# Patient Record
Sex: MALE | Race: WHITE | NOT HISPANIC OR LATINO | Employment: FULL TIME | URBAN - NONMETROPOLITAN AREA
[De-identification: names, ages, dates, MRNs, and addresses within clinical notes are randomized per-mention and may not be internally consistent; named-entity substitution may affect disease eponyms.]

---

## 2018-04-07 ENCOUNTER — APPOINTMENT (OUTPATIENT)
Dept: LAB | Facility: HOSPITAL | Age: 38
End: 2018-04-07
Attending: INTERNAL MEDICINE

## 2018-04-07 ENCOUNTER — TRANSCRIBE ORDERS (OUTPATIENT)
Dept: ADMINISTRATIVE | Facility: HOSPITAL | Age: 38
End: 2018-04-07

## 2018-04-07 DIAGNOSIS — Z13.6 SCREENING FOR CARDIOVASCULAR CONDITION: Primary | ICD-10-CM

## 2018-04-07 DIAGNOSIS — Z77.21: ICD-10-CM

## 2018-04-07 DIAGNOSIS — N52.9 ERECTILE DYSFUNCTION, UNSPECIFIED ERECTILE DYSFUNCTION TYPE: ICD-10-CM

## 2018-04-07 LAB
ALBUMIN SERPL-MCNC: 4.2 G/DL (ref 3.5–5)
ALBUMIN/GLOB SERPL: 1.6 G/DL (ref 1.5–2.5)
ALP SERPL-CCNC: 81 U/L (ref 40–129)
ALT SERPL W P-5'-P-CCNC: 44 U/L (ref 10–44)
ANION GAP SERPL CALCULATED.3IONS-SCNC: 6.5 MMOL/L (ref 3.6–11.2)
AST SERPL-CCNC: 30 U/L (ref 10–34)
BASOPHILS # BLD AUTO: 0.04 10*3/MM3 (ref 0–0.3)
BASOPHILS NFR BLD AUTO: 0.6 % (ref 0–2)
BILIRUB SERPL-MCNC: 0.7 MG/DL (ref 0.2–1.8)
BUN BLD-MCNC: 14 MG/DL (ref 7–21)
BUN/CREAT SERPL: 13.2 (ref 7–25)
CALCIUM SPEC-SCNC: 9.6 MG/DL (ref 7.7–10)
CHLORIDE SERPL-SCNC: 107 MMOL/L (ref 99–112)
CHOLEST SERPL-MCNC: 242 MG/DL (ref 0–200)
CO2 SERPL-SCNC: 27.5 MMOL/L (ref 24.3–31.9)
CREAT BLD-MCNC: 1.06 MG/DL (ref 0.43–1.29)
DEPRECATED RDW RBC AUTO: 39.8 FL (ref 37–54)
EOSINOPHIL # BLD AUTO: 0.08 10*3/MM3 (ref 0–0.7)
EOSINOPHIL NFR BLD AUTO: 1.2 % (ref 0–5)
ERYTHROCYTE [DISTWIDTH] IN BLOOD BY AUTOMATED COUNT: 12.5 % (ref 11.5–14.5)
FOLATE SERPL-MCNC: 14.6 NG/ML (ref 5.4–20)
GFR SERPL CREATININE-BSD FRML MDRD: 79 ML/MIN/1.73
GLOBULIN UR ELPH-MCNC: 2.7 GM/DL
GLUCOSE BLD-MCNC: 100 MG/DL (ref 70–110)
HBV SURFACE AB SER RIA-ACNC: NORMAL
HCT VFR BLD AUTO: 47.1 % (ref 42–52)
HCV AB SER DONR QL: NORMAL
HDLC SERPL-MCNC: 56 MG/DL (ref 60–100)
HGB BLD-MCNC: 16.3 G/DL (ref 14–18)
IMM GRANULOCYTES # BLD: 0.03 10*3/MM3 (ref 0–0.03)
IMM GRANULOCYTES NFR BLD: 0.4 % (ref 0–0.5)
LDLC SERPL CALC-MCNC: 145 MG/DL (ref 0–100)
LDLC/HDLC SERPL: 2.59 {RATIO}
LYMPHOCYTES # BLD AUTO: 2.61 10*3/MM3 (ref 1–3)
LYMPHOCYTES NFR BLD AUTO: 37.9 % (ref 21–51)
MCH RBC QN AUTO: 30.2 PG (ref 27–33)
MCHC RBC AUTO-ENTMCNC: 34.6 G/DL (ref 33–37)
MCV RBC AUTO: 87.4 FL (ref 80–94)
MONOCYTES # BLD AUTO: 0.57 10*3/MM3 (ref 0.1–0.9)
MONOCYTES NFR BLD AUTO: 8.3 % (ref 0–10)
NEUTROPHILS # BLD AUTO: 3.56 10*3/MM3 (ref 1.4–6.5)
NEUTROPHILS NFR BLD AUTO: 51.6 % (ref 30–70)
OSMOLALITY SERPL CALC.SUM OF ELEC: 281.8 MOSM/KG (ref 273–305)
PLATELET # BLD AUTO: 232 10*3/MM3 (ref 130–400)
PMV BLD AUTO: 9.6 FL (ref 6–10)
POTASSIUM BLD-SCNC: 3.9 MMOL/L (ref 3.5–5.3)
PROT SERPL-MCNC: 6.9 G/DL (ref 6–8)
RBC # BLD AUTO: 5.39 10*6/MM3 (ref 4.7–6.1)
SODIUM BLD-SCNC: 141 MMOL/L (ref 135–153)
TESTOST SERPL-MCNC: 468.14 NG/DL (ref 123.06–813.86)
TRIGL SERPL-MCNC: 206 MG/DL (ref 0–150)
TSH SERPL DL<=0.05 MIU/L-ACNC: 1.4 MIU/ML (ref 0.55–4.78)
VIT B12 BLD-MCNC: 613 PG/ML (ref 211–911)
VLDLC SERPL-MCNC: 41.2 MG/DL
WBC NRBC COR # BLD: 6.89 10*3/MM3 (ref 4.5–12.5)

## 2018-04-07 PROCEDURE — 84443 ASSAY THYROID STIM HORMONE: CPT | Performed by: INTERNAL MEDICINE

## 2018-04-07 PROCEDURE — 86803 HEPATITIS C AB TEST: CPT | Performed by: INTERNAL MEDICINE

## 2018-04-07 PROCEDURE — 86706 HEP B SURFACE ANTIBODY: CPT | Performed by: INTERNAL MEDICINE

## 2018-04-07 PROCEDURE — 82607 VITAMIN B-12: CPT | Performed by: INTERNAL MEDICINE

## 2018-04-07 PROCEDURE — 85025 COMPLETE CBC W/AUTO DIFF WBC: CPT | Performed by: INTERNAL MEDICINE

## 2018-04-07 PROCEDURE — 80061 LIPID PANEL: CPT | Performed by: INTERNAL MEDICINE

## 2018-04-07 PROCEDURE — 84403 ASSAY OF TOTAL TESTOSTERONE: CPT | Performed by: INTERNAL MEDICINE

## 2018-04-07 PROCEDURE — 82746 ASSAY OF FOLIC ACID SERUM: CPT | Performed by: INTERNAL MEDICINE

## 2018-04-07 PROCEDURE — 80053 COMPREHEN METABOLIC PANEL: CPT | Performed by: INTERNAL MEDICINE

## 2018-04-07 PROCEDURE — 36415 COLL VENOUS BLD VENIPUNCTURE: CPT | Performed by: INTERNAL MEDICINE

## 2020-12-01 ENCOUNTER — TRANSCRIBE ORDERS (OUTPATIENT)
Dept: ADMINISTRATIVE | Facility: HOSPITAL | Age: 40
End: 2020-12-01

## 2020-12-01 DIAGNOSIS — M54.50 LOW BACK PAIN, UNSPECIFIED BACK PAIN LATERALITY, UNSPECIFIED CHRONICITY, UNSPECIFIED WHETHER SCIATICA PRESENT: Primary | ICD-10-CM

## 2020-12-01 DIAGNOSIS — R10.31 ABDOMINAL PAIN, RIGHT LOWER QUADRANT: ICD-10-CM

## 2020-12-09 ENCOUNTER — HOSPITAL ENCOUNTER (OUTPATIENT)
Dept: MRI IMAGING | Facility: HOSPITAL | Age: 40
Discharge: HOME | End: 2020-12-09

## 2020-12-09 ENCOUNTER — HOSPITAL ENCOUNTER (OUTPATIENT)
Dept: ULTRASOUND IMAGING | Facility: HOSPITAL | Age: 40
Discharge: HOME | End: 2020-12-09

## 2020-12-09 DIAGNOSIS — M54.50 LOW BACK PAIN, UNSPECIFIED BACK PAIN LATERALITY, UNSPECIFIED CHRONICITY, UNSPECIFIED WHETHER SCIATICA PRESENT: ICD-10-CM

## 2020-12-09 DIAGNOSIS — R10.31 ABDOMINAL PAIN, RIGHT LOWER QUADRANT: ICD-10-CM

## 2020-12-09 PROCEDURE — 76700 US EXAM ABDOM COMPLETE: CPT | Performed by: RADIOLOGY

## 2020-12-09 PROCEDURE — 76700 US EXAM ABDOM COMPLETE: CPT

## 2020-12-09 PROCEDURE — 72148 MRI LUMBAR SPINE W/O DYE: CPT | Performed by: RADIOLOGY

## 2020-12-09 PROCEDURE — 72148 MRI LUMBAR SPINE W/O DYE: CPT

## 2021-01-07 ENCOUNTER — TRANSCRIBE ORDERS (OUTPATIENT)
Dept: ADMINISTRATIVE | Facility: HOSPITAL | Age: 41
End: 2021-01-07

## 2021-01-07 DIAGNOSIS — Z01.818 PRE-OPERATIVE CLEARANCE: Primary | ICD-10-CM

## 2021-01-10 ENCOUNTER — APPOINTMENT (OUTPATIENT)
Dept: MRI IMAGING | Facility: HOSPITAL | Age: 41
End: 2021-01-10

## 2021-01-10 ENCOUNTER — HOSPITAL ENCOUNTER (EMERGENCY)
Facility: HOSPITAL | Age: 41
Discharge: HOME | End: 2021-01-10
Attending: FAMILY MEDICINE | Admitting: FAMILY MEDICINE

## 2021-01-10 VITALS
WEIGHT: 230 LBS | BODY MASS INDEX: 34.07 KG/M2 | OXYGEN SATURATION: 98 % | HEART RATE: 76 BPM | HEIGHT: 69 IN | TEMPERATURE: 98 F | DIASTOLIC BLOOD PRESSURE: 76 MMHG | SYSTOLIC BLOOD PRESSURE: 142 MMHG | RESPIRATION RATE: 16 BRPM

## 2021-01-10 DIAGNOSIS — M51.26 LUMBAR DISC HERNIATION: Primary | ICD-10-CM

## 2021-01-10 LAB
ALBUMIN SERPL-MCNC: 4.25 G/DL (ref 3.5–5.2)
ALBUMIN/GLOB SERPL: 1.6 G/DL
ALP SERPL-CCNC: 84 U/L (ref 39–117)
ALT SERPL W P-5'-P-CCNC: 15 U/L (ref 1–41)
ANION GAP SERPL CALCULATED.3IONS-SCNC: 12.5 MMOL/L (ref 5–15)
AST SERPL-CCNC: 14 U/L (ref 1–40)
BASOPHILS # BLD AUTO: 0.06 10*3/MM3 (ref 0–0.2)
BASOPHILS NFR BLD AUTO: 0.7 % (ref 0–1.5)
BILIRUB SERPL-MCNC: 0.5 MG/DL (ref 0–1.2)
BUN SERPL-MCNC: 16 MG/DL (ref 6–20)
BUN/CREAT SERPL: 16.3 (ref 7–25)
CALCIUM SPEC-SCNC: 9.2 MG/DL (ref 8.6–10.5)
CHLORIDE SERPL-SCNC: 107 MMOL/L (ref 98–107)
CO2 SERPL-SCNC: 20.5 MMOL/L (ref 22–29)
CREAT SERPL-MCNC: 0.98 MG/DL (ref 0.76–1.27)
CRP SERPL-MCNC: <0.3 MG/DL (ref 0–0.5)
DEPRECATED RDW RBC AUTO: 41.3 FL (ref 37–54)
EOSINOPHIL # BLD AUTO: 0.15 10*3/MM3 (ref 0–0.4)
EOSINOPHIL NFR BLD AUTO: 1.8 % (ref 0.3–6.2)
ERYTHROCYTE [DISTWIDTH] IN BLOOD BY AUTOMATED COUNT: 12.6 % (ref 12.3–15.4)
ERYTHROCYTE [SEDIMENTATION RATE] IN BLOOD: 4 MM/HR (ref 0–15)
GFR SERPL CREATININE-BSD FRML MDRD: 85 ML/MIN/1.73
GLOBULIN UR ELPH-MCNC: 2.7 GM/DL
GLUCOSE SERPL-MCNC: 117 MG/DL (ref 65–99)
HCT VFR BLD AUTO: 48.1 % (ref 37.5–51)
HGB BLD-MCNC: 16.4 G/DL (ref 13–17.7)
IMM GRANULOCYTES # BLD AUTO: 0.03 10*3/MM3 (ref 0–0.05)
IMM GRANULOCYTES NFR BLD AUTO: 0.4 % (ref 0–0.5)
LYMPHOCYTES # BLD AUTO: 2.23 10*3/MM3 (ref 0.7–3.1)
LYMPHOCYTES NFR BLD AUTO: 26.9 % (ref 19.6–45.3)
MCH RBC QN AUTO: 30.7 PG (ref 26.6–33)
MCHC RBC AUTO-ENTMCNC: 34.1 G/DL (ref 31.5–35.7)
MCV RBC AUTO: 90.1 FL (ref 79–97)
MONOCYTES # BLD AUTO: 0.47 10*3/MM3 (ref 0.1–0.9)
MONOCYTES NFR BLD AUTO: 5.7 % (ref 5–12)
NEUTROPHILS NFR BLD AUTO: 5.36 10*3/MM3 (ref 1.7–7)
NEUTROPHILS NFR BLD AUTO: 64.5 % (ref 42.7–76)
NRBC BLD AUTO-RTO: 0 /100 WBC (ref 0–0.2)
PLATELET # BLD AUTO: 252 10*3/MM3 (ref 140–450)
PMV BLD AUTO: 8.7 FL (ref 6–12)
POTASSIUM SERPL-SCNC: 3.9 MMOL/L (ref 3.5–5.2)
PROT SERPL-MCNC: 6.9 G/DL (ref 6–8.5)
RBC # BLD AUTO: 5.34 10*6/MM3 (ref 4.14–5.8)
SODIUM SERPL-SCNC: 140 MMOL/L (ref 136–145)
WBC # BLD AUTO: 8.3 10*3/MM3 (ref 3.4–10.8)

## 2021-01-10 PROCEDURE — 72148 MRI LUMBAR SPINE W/O DYE: CPT | Performed by: RADIOLOGY

## 2021-01-10 PROCEDURE — 25010000002 MORPHINE PER 10 MG: Performed by: FAMILY MEDICINE

## 2021-01-10 PROCEDURE — 86140 C-REACTIVE PROTEIN: CPT | Performed by: FAMILY MEDICINE

## 2021-01-10 PROCEDURE — 80053 COMPREHEN METABOLIC PANEL: CPT | Performed by: FAMILY MEDICINE

## 2021-01-10 PROCEDURE — 25010000002 ONDANSETRON PER 1 MG

## 2021-01-10 PROCEDURE — 96375 TX/PRO/DX INJ NEW DRUG ADDON: CPT

## 2021-01-10 PROCEDURE — 99283 EMERGENCY DEPT VISIT LOW MDM: CPT

## 2021-01-10 PROCEDURE — 85025 COMPLETE CBC W/AUTO DIFF WBC: CPT | Performed by: FAMILY MEDICINE

## 2021-01-10 PROCEDURE — 85652 RBC SED RATE AUTOMATED: CPT | Performed by: FAMILY MEDICINE

## 2021-01-10 PROCEDURE — 25010000002 METHYLPREDNISOLONE PER 40 MG: Performed by: FAMILY MEDICINE

## 2021-01-10 PROCEDURE — 72148 MRI LUMBAR SPINE W/O DYE: CPT

## 2021-01-10 PROCEDURE — 96374 THER/PROPH/DIAG INJ IV PUSH: CPT

## 2021-01-10 RX ORDER — METHYLPREDNISOLONE 4 MG/1
TABLET ORAL
Qty: 21 EACH | Refills: 0 | Status: SHIPPED | OUTPATIENT
Start: 2021-01-10 | End: 2021-11-03

## 2021-01-10 RX ORDER — HYDROCODONE BITARTRATE AND ACETAMINOPHEN 7.5; 325 MG/1; MG/1
1 TABLET ORAL EVERY 6 HOURS PRN
Qty: 10 TABLET | Refills: 0 | Status: SHIPPED | OUTPATIENT
Start: 2021-01-10 | End: 2021-01-10 | Stop reason: SDUPTHER

## 2021-01-10 RX ORDER — ONDANSETRON 2 MG/ML
INJECTION INTRAMUSCULAR; INTRAVENOUS
Status: COMPLETED
Start: 2021-01-10 | End: 2021-01-10

## 2021-01-10 RX ORDER — HYDROCODONE BITARTRATE AND ACETAMINOPHEN 7.5; 325 MG/1; MG/1
1 TABLET ORAL EVERY 6 HOURS PRN
Qty: 10 TABLET | Refills: 0 | Status: SHIPPED | OUTPATIENT
Start: 2021-01-10 | End: 2021-01-15

## 2021-01-10 RX ORDER — SODIUM CHLORIDE 0.9 % (FLUSH) 0.9 %
10 SYRINGE (ML) INJECTION AS NEEDED
Status: DISCONTINUED | OUTPATIENT
Start: 2021-01-10 | End: 2021-01-10 | Stop reason: HOSPADM

## 2021-01-10 RX ORDER — ONDANSETRON 4 MG/1
4 TABLET, ORALLY DISINTEGRATING ORAL EVERY 6 HOURS PRN
Qty: 10 TABLET | Refills: 0 | Status: SHIPPED | OUTPATIENT
Start: 2021-01-10 | End: 2021-11-03

## 2021-01-10 RX ORDER — METHYLPREDNISOLONE SODIUM SUCCINATE 40 MG/ML
80 INJECTION, POWDER, LYOPHILIZED, FOR SOLUTION INTRAMUSCULAR; INTRAVENOUS ONCE
Status: COMPLETED | OUTPATIENT
Start: 2021-01-10 | End: 2021-01-10

## 2021-01-10 RX ORDER — ONDANSETRON 2 MG/ML
4 INJECTION INTRAMUSCULAR; INTRAVENOUS ONCE
Status: COMPLETED | OUTPATIENT
Start: 2021-01-10 | End: 2021-01-10

## 2021-01-10 RX ADMIN — ONDANSETRON 4 MG: 2 INJECTION INTRAMUSCULAR; INTRAVENOUS at 11:49

## 2021-01-10 RX ADMIN — METHYLPREDNISOLONE SODIUM SUCCINATE 80 MG: 40 INJECTION, POWDER, FOR SOLUTION INTRAMUSCULAR; INTRAVENOUS at 11:48

## 2021-01-10 RX ADMIN — MORPHINE SULFATE 4 MG: 4 INJECTION, SOLUTION INTRAMUSCULAR; INTRAVENOUS at 11:48

## 2021-01-10 NOTE — ED NOTES
Rectal exam performed by Dr. De La O, Pt positioned, privacy provided, chaperoned by myself. Pt tolerated well.      Jaki Watters RN  01/10/21 9769

## 2021-01-10 NOTE — ED PROVIDER NOTES
Subjective   40-year-old male presents the emergency room with complaints of low back pain.  Patient reports that he had an MRI couple weeks ago as well as on December 1 that showed a herniated disc.  He states that he was putting ice on a car seat after discharge and subsequently felt something slip in his back.  He states since that time is been unable to walk.  He states his pain is in his left leg as well.  He denies bowel urinary incontinence.  He states he has pain with any movement.  He reports he has an appointment with Dr. Lane with neurosurgery this week.  Due to symptoms patient came emergency room for evaluation.  Of note patient states he took Robaxin as well as 800 mg ibuprofen today however he states pain persist.      Back Pain  Location:  Lumbar spine  Quality:  Stabbing  Pain severity:  Severe  Timing:  Constant  Chronicity:  New  Associated symptoms: leg pain and weakness    Associated symptoms: no abdominal pain, no abdominal swelling, no bladder incontinence, no bowel incontinence, no chest pain, no fever, no pelvic pain, no perianal numbness and no tingling        Review of Systems   Constitutional: Negative for fatigue and fever.   Cardiovascular: Negative for chest pain.   Gastrointestinal: Negative for abdominal pain and bowel incontinence.   Genitourinary: Negative for bladder incontinence and pelvic pain.   Musculoskeletal: Positive for back pain.   Neurological: Positive for weakness. Negative for tingling.   All other systems reviewed and are negative.      Past Medical History:   Diagnosis Date   • Back pain        No Known Allergies    Past Surgical History:   Procedure Laterality Date   • HERNIA REPAIR     • TONSILLECTOMY     • VASECTOMY         History reviewed. No pertinent family history.    Social History     Socioeconomic History   • Marital status:      Spouse name: Not on file   • Number of children: Not on file   • Years of education: Not on file   • Highest education  level: Not on file   Tobacco Use   • Smoking status: Current Every Day Smoker     Packs/day: 0.50   Substance and Sexual Activity   • Alcohol use: No   • Drug use: No           Objective   Physical Exam  Vitals signs and nursing note reviewed. Exam conducted with a chaperone present.   Constitutional:       Comments: Appears to be in pain.  Nontoxic.   HENT:      Head: Normocephalic and atraumatic.      Nose: Nose normal.      Mouth/Throat:      Mouth: Mucous membranes are moist.   Eyes:      Conjunctiva/sclera: Conjunctivae normal.      Pupils: Pupils are equal, round, and reactive to light.   Neck:      Musculoskeletal: Neck supple.   Cardiovascular:      Rate and Rhythm: Normal rate and regular rhythm.      Comments: 2+ radial pulses 2+ dorsalis pedis 2+ posterior tibialis pulses bilaterally.  No extrasystoles.  Pulmonary:      Comments: Lungs clear to auscultation no rhonchi's rales or wheezes.  Abdominal:      General: Bowel sounds are normal.      Palpations: Abdomen is soft.      Comments: No guarding or rigidity.   Genitourinary:     Rectum: Normal. Normal anal tone.   Musculoskeletal:      Comments: Paraspinal lumbar tenderness.  Pain with movement.  No crepitance.  Pelvis stable.  No clubbing cyanosis or edema.  No calf tenderness.   Skin:     General: Skin is warm.      Capillary Refill: Capillary refill takes less than 2 seconds.   Neurological:      Mental Status: He is alert and oriented to person, place, and time.      Cranial Nerves: No cranial nerve deficit.      Sensory: No sensory deficit.      Comments: 2+ patellar reflexes bilaterally 2+ Achilles reflex bilaterally.  No saddle anesthesia.   Psychiatric:         Mood and Affect: Mood normal.         Procedures           ED Course  ED Course as of Benji 10 1812   Sun Benji 10, 2021   1150 Patient white blood cell count hemoglobin hematocrit unremarkable    [BB]   1200 Patient rectal exam performed with RN chaperone rectal tone intact.    [BB]   1218  Patient sed rate unremarkable    [BB]   1229 CMP slight elevated glucose 117    [BB]   1242 Patient CRP unremarkable    [BB]   1349 Mri Lumbar Spine Without Contrast    Result Date: 1/10/2021   1. No acute fracture or traumatic malalignment. 2. Disc degeneration with superimposed disc protrusions L1/2, L3/4, and L4/5 which contribute to central canal and neural foraminal stenosis as detailed above. 3. There are no levels of severe central canal or neural foraminal stenosis identified or findings to suggest neurogenic claudication. 4. Otherwise unremarkable exam.  This report was finalized on 1/10/2021 12:42 PM by Dr. Kai Beth MD.          [BB]   1349 Viewed MRI.  Patient states his pain has improved some but recurs with any type of movement.  Have contacted neurosurgery at [facility] awaiting callback    [BB]   1351 Have discussed case with Dr. Strickland and discussed patient's symptoms and findings as well as MRI results he states patient be given Medrol Dosepak pain control to follow-up as scheduled.  He states no acute interventions at this time.    [BB]   1356 Discussed findings with patient have discussed recommendations by neurosurgery have discussed warning signs symptoms that warrant return emergency room patient verbalized understanding.    [BB]   1357 [facility] [phone]    [BB]      ED Course User Index  [BB] Laci De La O MD                                           [facility]    Final diagnoses:   Lumbar disc herniation            Laci De La O MD  01/10/21 1812

## 2021-01-15 ENCOUNTER — OFFICE VISIT (OUTPATIENT)
Dept: NEUROSURGERY | Facility: CLINIC | Age: 41
End: 2021-01-15

## 2021-01-15 VITALS — HEIGHT: 69 IN | WEIGHT: 239 LBS | BODY MASS INDEX: 35.4 KG/M2 | TEMPERATURE: 97.3 F

## 2021-01-15 DIAGNOSIS — M47.819 FACET ARTHROPATHY: ICD-10-CM

## 2021-01-15 DIAGNOSIS — M54.9 MECHANICAL BACK PAIN: Primary | ICD-10-CM

## 2021-01-15 DIAGNOSIS — M51.36 DDD (DEGENERATIVE DISC DISEASE), LUMBAR: ICD-10-CM

## 2021-01-15 DIAGNOSIS — M47.816 LUMBAR SPONDYLOSIS: ICD-10-CM

## 2021-01-15 PROBLEM — F41.8 MIXED ANXIETY AND DEPRESSIVE DISORDER: Status: ACTIVE | Noted: 2021-01-15

## 2021-01-15 PROCEDURE — 99204 OFFICE O/P NEW MOD 45 MIN: CPT | Performed by: NEUROLOGICAL SURGERY

## 2021-01-15 RX ORDER — IPRATROPIUM BROMIDE 42 UG/1
SPRAY, METERED NASAL
COMMUNITY
Start: 2020-10-31 | End: 2021-11-03

## 2021-01-15 RX ORDER — LORATADINE, PSEUDOEPHEDRINE SULFATE 5; 120 MG/1; MG/1
TABLET, FILM COATED, EXTENDED RELEASE ORAL
COMMUNITY
Start: 2020-10-31

## 2021-01-15 RX ORDER — GUAIFENESIN 600 MG/1
TABLET, EXTENDED RELEASE ORAL
COMMUNITY
Start: 2020-10-31 | End: 2021-11-03

## 2021-01-15 RX ORDER — IBUPROFEN 800 MG/1
TABLET ORAL
COMMUNITY
Start: 2020-12-03

## 2021-01-15 RX ORDER — METHOCARBAMOL 750 MG/1
TABLET, FILM COATED ORAL
COMMUNITY
Start: 2020-12-03 | End: 2021-11-03

## 2021-01-15 RX ORDER — CITALOPRAM 40 MG/1
TABLET ORAL
COMMUNITY
Start: 2020-11-22

## 2021-01-15 RX ORDER — ALBUTEROL SULFATE 90 UG/1
AEROSOL, METERED RESPIRATORY (INHALATION)
COMMUNITY
Start: 2020-10-31 | End: 2021-11-03

## 2021-01-15 RX ORDER — GABAPENTIN 300 MG/1
CAPSULE ORAL
Qty: 90 CAPSULE | Refills: 0 | Status: SHIPPED | OUTPATIENT
Start: 2021-01-15 | End: 2021-11-03

## 2021-01-15 NOTE — PROGRESS NOTES
Patient: Paras Freed  : 1980    Primary Care Provider: Rony Vicente MD    Requesting Provider: As above        History    Chief Complaint:   1.  Chronic low back pain.  2.  Right thigh numbness and burning.  2.  Numbness in the hands.    History of Present Illness: Mr. Freed is a 40-year-old  who was had a decade long history of low back pain.  He denies any trauma or inciting event.  He has had some numbness rather chronically in the side of the right thigh but more recently has developed some burning in the right thigh.  This does not typically extend below the knee.  He does have some numbness in his hands that is worse at night.  Certain head positions will aggravate this.  He has had a disc protrusion in his neck in the past that caused numbness of multiple digits of the right hand.  His low back symptoms are worse with sitting.  He is better with rest.  In the past he has done physical therapy and chiropractic.  Muscle relaxants have not been particularly helpful.  He does take ibuprofen on a daily basis.  He does not have significant left lower extremity symptoms.  He stopped smoking in .    Review of Systems   Constitutional: Negative for activity change, appetite change, chills, diaphoresis, fatigue, fever and unexpected weight change.   HENT: Positive for ear pain, sinus pressure and sneezing. Negative for congestion, dental problem, drooling, ear discharge, facial swelling, hearing loss, mouth sores, nosebleeds, postnasal drip, rhinorrhea, sinus pain, sore throat, tinnitus, trouble swallowing and voice change.    Eyes: Negative for photophobia, pain, discharge, redness, itching and visual disturbance.   Respiratory: Negative for apnea, cough, choking, chest tightness, shortness of breath, wheezing and stridor.    Cardiovascular: Negative for chest pain, palpitations and leg swelling.   Gastrointestinal: Positive for anal bleeding, blood in stool, constipation and diarrhea.  "Negative for abdominal distention, abdominal pain, nausea, rectal pain and vomiting.   Endocrine: Negative for cold intolerance, heat intolerance, polydipsia, polyphagia and polyuria.   Genitourinary: Negative for decreased urine volume, difficulty urinating, discharge, dysuria, enuresis, flank pain, frequency, genital sores, hematuria, penile pain, penile swelling, scrotal swelling, testicular pain and urgency.   Musculoskeletal: Positive for back pain, neck pain and neck stiffness. Negative for arthralgias, gait problem, joint swelling and myalgias.   Skin: Negative for color change, pallor, rash and wound.   Allergic/Immunologic: Negative for environmental allergies, food allergies and immunocompromised state.   Neurological: Positive for numbness. Negative for dizziness, tremors, seizures, syncope, facial asymmetry, speech difficulty, weakness, light-headedness and headaches.   Hematological: Negative for adenopathy. Does not bruise/bleed easily.   Psychiatric/Behavioral: Negative for agitation, behavioral problems, confusion, decreased concentration, dysphoric mood, hallucinations, self-injury, sleep disturbance and suicidal ideas. The patient is not nervous/anxious and is not hyperactive.        The patient's past medical history, past surgical history, family history, and social history have been reviewed at length in the electronic medical record.    Physical Exam:   Temp 97.3 °F (36.3 °C)   Ht 175.3 cm (69.02\")   Wt 108 kg (239 lb)   BMI 35.28 kg/m²   CONSTITUTIONAL: Patient is well-nourished, pleasant and appears stated age.  CV: Heart regular rate and rhythm without murmur, rub, or gallop.  PULMONARY: Lungs are clear to ascultation.  MUSCULOSKELETAL:  Neck tenderness to palpation is not observed.   ROM in neck is normal.  Extending his low back is quite aggravating.  Right leg raising is negative.  Luke signs are negative.  Tinel's sign is negative at both wrists.  NEUROLOGICAL:  Orientation, " memory, attention span, language function, and cognition have been examined and are intact.  Strength is intact in the upper and lower extremities to direct testing.  Muscle tone is normal throughout.  Station and gait are normal.  Sensation is altered to light touch testing in the anterior and right lateral thigh.  Deep tendon reflexes are 2+ and symmetrical.  Nick's Sign is negative bilaterally. No clonus is elicited.  Coordination is intact.      Medical Decision Making    Data Review:   MRI of the lumbar spine dated 1/10/2021 demonstrates some degenerative disc disease.  This is most pronounced at L1-2 where there is some broad-based disc bulging.  There is a small hemangioma rightward within the vertebral body.  I do not see anything that would clearly impinge on the right L2 or L3 nerve roots.    Diagnosis:   1.  Mechanical low back pain.  2.  Lumbar degenerative disc disease.  3.  Possible meralgia paresthetica.  4.  History of cervical radiculopathy.  5.  Possible peripheral nerve entrapment.    Treatment Options:   I have started the patient on gabapentin.  We will see if this helps with some of his thigh symptoms.  There is no simple solution for his more chronic axial mechanical low back pain.  If his hand symptoms become more problematic then I will check an MRI of the cervical spine as well as electrodiagnostic studies.  He will follow-up in our clinic in several weeks to check on his progress.       Diagnosis Plan   1. Mechanical back pain     2. Lumbar spondylosis  gabapentin (NEURONTIN) 300 MG capsule   3. Facet arthropathy     4. DDD (degenerative disc disease), lumbar         Scribed for Rony Strickland MD by Jaci Anderson, ALICIA 1/15/2021 15:15 EST      I, Dr. Strickland, personally performed the services described in the documentation, as scribed in my presence, and it is both accurate and complete.

## 2021-03-18 ENCOUNTER — BULK ORDERING (OUTPATIENT)
Dept: CASE MANAGEMENT | Facility: OTHER | Age: 41
End: 2021-03-18

## 2021-03-18 DIAGNOSIS — Z23 IMMUNIZATION DUE: ICD-10-CM

## 2021-04-09 ENCOUNTER — HOSPITAL ENCOUNTER (OUTPATIENT)
Dept: GENERAL RADIOLOGY | Facility: HOSPITAL | Age: 41
Discharge: HOME | End: 2021-04-09
Admitting: NURSE PRACTITIONER

## 2021-04-09 ENCOUNTER — TRANSCRIBE ORDERS (OUTPATIENT)
Dept: OTHER | Facility: OTHER | Age: 41
End: 2021-04-09

## 2021-04-09 DIAGNOSIS — M25.562 LEFT KNEE PAIN, UNSPECIFIED CHRONICITY: Primary | ICD-10-CM

## 2021-04-09 DIAGNOSIS — M25.562 LEFT KNEE PAIN, UNSPECIFIED CHRONICITY: ICD-10-CM

## 2021-04-09 PROCEDURE — 73564 X-RAY EXAM KNEE 4 OR MORE: CPT

## 2021-04-09 PROCEDURE — 73564 X-RAY EXAM KNEE 4 OR MORE: CPT | Performed by: RADIOLOGY

## 2021-07-29 ENCOUNTER — HOSPITAL ENCOUNTER (OUTPATIENT)
Dept: HOSPITAL 79 - SLEEP | Age: 41
End: 2021-07-29
Payer: COMMERCIAL

## 2021-07-29 DIAGNOSIS — R06.83: Primary | ICD-10-CM

## 2021-11-03 ENCOUNTER — OFFICE VISIT (OUTPATIENT)
Dept: UROLOGY | Facility: CLINIC | Age: 41
End: 2021-11-03

## 2021-11-03 VITALS
HEIGHT: 69 IN | SYSTOLIC BLOOD PRESSURE: 145 MMHG | WEIGHT: 250 LBS | DIASTOLIC BLOOD PRESSURE: 74 MMHG | BODY MASS INDEX: 37.03 KG/M2

## 2021-11-03 DIAGNOSIS — E29.1 HYPOGONADISM IN MALE: Primary | ICD-10-CM

## 2021-11-03 PROCEDURE — 99203 OFFICE O/P NEW LOW 30 MIN: CPT | Performed by: UROLOGY

## 2021-11-03 RX ORDER — BUPROPION HYDROCHLORIDE 150 MG/1
TABLET ORAL
COMMUNITY
Start: 2021-10-18

## 2021-11-03 NOTE — PROGRESS NOTES
Low Testosterone Office Visit      Patient Name: Paras Freed  : 1980   MRN: [id number]     Chief Complaint: Low Testosterone.    Chief Complaint   Patient presents with   • Low Testosterone     New Patient    .     Referring Provider: Daniela Martinez APRN    History of Present Illness: Mr. Freed is a 41 y.o. male with history of low testosterone. Is seeing a psychiatrist for depression and anxiety.  He takes Wellbutrin and Celexa. He is also on Rexulti.  He was put on Wellbutrin about 2 weeks ago for fatigue.  Reports since starting these medications he has noticed he was more tired and lethargic.  He lacked motivation.  His testosterone was checked and found to be 205.  He had a Testosterone level checked in 2018 and was 465 at that time.   He also has sleep apnea.        He has had a vasectomy.    No dysuria or gross hematuria.  No LUTS.    Complains of mild ED.  States erections are not as good as they used to be.  However, he is able to get an erection and have satisfactory intercourse.      The serum test was collected due to the following complaints:     Low libido   no  Low energy   yes  Poor sleep   yes  Mental clarity issues  no  History of depression? yes  Erectile dysfunction?  yes  Any potential interest in conception?  no      Subjective      Review of System: Review of Systems   Constitutional: Positive for fatigue. Negative for chills and fever.   HENT: Negative for congestion and sinus pressure.    Respiratory: Negative for chest tightness and shortness of breath.    Cardiovascular: Negative for chest pain.   Gastrointestinal: Positive for constipation and diarrhea. Negative for abdominal pain, nausea and vomiting.   Genitourinary: Negative for dysuria, flank pain, frequency, hematuria, penile pain, testicular pain and urgency.   Musculoskeletal: Positive for back pain. Negative for neck pain.   Skin: Negative for rash.   Neurological: Negative for dizziness and headaches.  "  Hematological: Does not bruise/bleed easily.   Psychiatric/Behavioral: Negative for dysphoric mood. The patient is not nervous/anxious.       I have reviewed the ROS documented by my clinical staff, I have updated appropriately and I agree. Karina Barnes MD    Past Medical History:  Past Medical History:   Diagnosis Date   • Back pain    • Blood in stool        Past Surgical History:  Past Surgical History:   Procedure Laterality Date   • HERNIA REPAIR     • TONSILLECTOMY     • VASECTOMY         Medications:    Current Outpatient Medications:   •  buPROPion XL (WELLBUTRIN XL) 150 MG 24 hr tablet, , Disp: , Rfl:   •  Cholecalciferol 1.25 MG (18619 UT) tablet, take 1 capsule only once weekly, Disp: , Rfl:   •  citalopram (CeleXA) 40 MG tablet, , Disp: , Rfl:   •  ibuprofen (ADVIL,MOTRIN) 800 MG tablet, , Disp: , Rfl:   •  Loratadine-D 12HR 5-120 MG per 12 hr tablet, , Disp: , Rfl:     Allergies:  No Known Allergies    Social History:  Social History     Socioeconomic History   • Marital status:    Tobacco Use   • Smoking status: Former Smoker     Packs/day: 0.50     Types: Cigarettes     Quit date:      Years since quittin.8   • Smokeless tobacco: Never Used   Substance and Sexual Activity   • Alcohol use: Not Currently   • Drug use: Not Currently   • Sexual activity: Defer       Family History:  Family History   Problem Relation Age of Onset   • Other Father    • Hypertension Father    • Heart disease Father    • Cancer Maternal Grandmother    • Diabetes Paternal Grandmother    • Heart disease Paternal Grandfather        Objective     Physical Exam:   Vital Signs:   Vitals:    21 1425   BP: 145/74   Weight: 113 kg (250 lb)   Height: 175.3 cm (69\")     Body mass index is 36.92 kg/m².     Physical Exam  Vitals and nursing note reviewed.   Constitutional:       General: He is awake. He is not in acute distress.     Appearance: Normal appearance. He is well-developed. He is obese.   HENT:      " Head: Normocephalic and atraumatic.      Right Ear: External ear normal.      Left Ear: External ear normal.      Nose: Nose normal.   Eyes:      Conjunctiva/sclera: Conjunctivae normal.   Pulmonary:      Effort: Pulmonary effort is normal.   Abdominal:      General: There is no distension.      Palpations: Abdomen is soft. There is no mass.      Tenderness: There is no abdominal tenderness. There is no right CVA tenderness, left CVA tenderness, guarding or rebound.      Hernia: No hernia is present. There is no hernia in the left inguinal area or right inguinal area.   Genitourinary:     Pubic Area: No rash.       Penis: Normal.       Testes: Normal.      Prostate: Normal.      Rectum: Normal. No mass or tenderness. Normal anal tone.      Comments: Approx 15 g prostate.  No nodules.   Musculoskeletal:      Cervical back: Normal range of motion.   Lymphadenopathy:      Lower Body: No right inguinal adenopathy. No left inguinal adenopathy.   Skin:     General: Skin is warm.   Neurological:      General: No focal deficit present.      Mental Status: He is alert and oriented to person, place, and time.   Psychiatric:         Behavior: Behavior normal. Behavior is cooperative.         Labs:   Lab Results   Component Value Date    TESTOSTERONE 468.14 04/07/2018       No results found for: PSA    Lab Results   Component Value Date    WBC 8.30 01/10/2021    HGB 16.4 01/10/2021    HCT 48.1 01/10/2021    MCV 90.1 01/10/2021     01/10/2021       No results found for: PSA    Images:   No Images in the past 120 days found..    Measures:   Tobacco:   Paras Freed  reports that he quit smoking about 9 years ago. His smoking use included cigarettes. He smoked 0.50 packs per day. He has never used smokeless tobacco.    Urine Incontinence: ( NOUI)  None    Assessment / Plan      Assessment/Plan:   Mr. Freed is a 41 y.o. male who presented today with low testosterone.  Testosterone was normal in 2018.  Since then he has  developed fatigue, low libido, depression, and difficulty sleeping.  He does have sleep apnea.  Testosterone replacement will likely make his sleep apnea worse.  He states that he has another sleep study scheduled for December.  Before we start testosterone replacement his sleep apnea really needs to be under good control.  Discussed treatment options and he will likely go with injections if needed.  I will obtain blood work to confirm his diagnosis.    Diagnoses and all orders for this visit:    1. Hypogonadism in male (Primary)  -     CBC Auto Differential; Future  -     Prolactin; Future  -     Luteinizing Hormone; Future  -     Testosterone; Future  -     PSA DIAGNOSTIC; Future         Patient Education:   We discussed today the role testosterone plays for a male patient. I have indicated that low testosterone can be associated with metabolic syndrome, erectile dysfunction, coronary artery disease, diabetes, depression, osteoporosis, fatigue, low sex drive, poor sleep, high cholesterol, increased abdominal fat, muscle loss, irritability, hot flashes, and inability to concentrate.     Treatment options were discussed including SERMs, aromatase inhibitors, topical gel or patch, oral troches, nasal spray, testosterone injections every 2-3 weeks, long-acting injections every 10 weeks, and testosterone pellets placed in clinic every 4-6 months.    I explained the risks, benefits, and alternatives to testosterone therapies. I informed him of the potential SEs of chest and leg swelling, increased acne, change in mood, polycythemia, and worsening of undiagnosed prostate cancer.     Follow Up:   Return in about 2 weeks (around 11/17/2021) for Video visit.    I spent approximately 30 minutes providing clinical care for this patient; including review of patient's chart and provider documentation, face to face time spent with patient in examination room (obtaining history, performing physical exam, discussing diagnosis and  management options), placing orders, and completing patient documentation.     Karina Barnes MD  [facility] [place]

## 2021-11-08 ENCOUNTER — LAB (OUTPATIENT)
Dept: UROLOGY | Facility: CLINIC | Age: 41
End: 2021-11-08

## 2021-11-08 DIAGNOSIS — E29.1 HYPOGONADISM IN MALE: ICD-10-CM

## 2021-11-08 LAB
BASOPHILS # BLD AUTO: 0.08 10*3/MM3 (ref 0–0.2)
BASOPHILS NFR BLD AUTO: 1.2 % (ref 0–1.5)
DEPRECATED RDW RBC AUTO: 42.2 FL (ref 37–54)
EOSINOPHIL # BLD AUTO: 0.1 10*3/MM3 (ref 0–0.4)
EOSINOPHIL NFR BLD AUTO: 1.5 % (ref 0.3–6.2)
ERYTHROCYTE [DISTWIDTH] IN BLOOD BY AUTOMATED COUNT: 12.7 % (ref 12.3–15.4)
HCT VFR BLD AUTO: 46.7 % (ref 37.5–51)
HGB BLD-MCNC: 15.4 G/DL (ref 13–17.7)
IMM GRANULOCYTES # BLD AUTO: 0.02 10*3/MM3 (ref 0–0.05)
IMM GRANULOCYTES NFR BLD AUTO: 0.3 % (ref 0–0.5)
LH SERPL-ACNC: 3.79 MIU/ML
LYMPHOCYTES # BLD AUTO: 2.46 10*3/MM3 (ref 0.7–3.1)
LYMPHOCYTES NFR BLD AUTO: 35.8 % (ref 19.6–45.3)
MCH RBC QN AUTO: 30 PG (ref 26.6–33)
MCHC RBC AUTO-ENTMCNC: 33 G/DL (ref 31.5–35.7)
MCV RBC AUTO: 90.9 FL (ref 79–97)
MONOCYTES # BLD AUTO: 0.44 10*3/MM3 (ref 0.1–0.9)
MONOCYTES NFR BLD AUTO: 6.4 % (ref 5–12)
NEUTROPHILS NFR BLD AUTO: 3.78 10*3/MM3 (ref 1.7–7)
NEUTROPHILS NFR BLD AUTO: 54.8 % (ref 42.7–76)
NRBC BLD AUTO-RTO: 0 /100 WBC (ref 0–0.2)
PLATELET # BLD AUTO: 258 10*3/MM3 (ref 140–450)
PMV BLD AUTO: 9.7 FL (ref 6–12)
PROLACTIN SERPL-MCNC: 13.3 NG/ML (ref 4.04–15.2)
PSA SERPL-MCNC: 0.54 NG/ML (ref 0–4)
RBC # BLD AUTO: 5.14 10*6/MM3 (ref 4.14–5.8)
TESTOST SERPL-MCNC: 316 NG/DL (ref 249–836)
WBC # BLD AUTO: 6.88 10*3/MM3 (ref 3.4–10.8)

## 2021-11-08 PROCEDURE — 84146 ASSAY OF PROLACTIN: CPT | Performed by: UROLOGY

## 2021-11-08 PROCEDURE — 85025 COMPLETE CBC W/AUTO DIFF WBC: CPT | Performed by: UROLOGY

## 2021-11-08 PROCEDURE — 84403 ASSAY OF TOTAL TESTOSTERONE: CPT | Performed by: UROLOGY

## 2021-11-08 PROCEDURE — 83002 ASSAY OF GONADOTROPIN (LH): CPT | Performed by: UROLOGY

## 2021-11-08 PROCEDURE — 84153 ASSAY OF PSA TOTAL: CPT | Performed by: UROLOGY

## 2021-12-07 ENCOUNTER — TELEMEDICINE (OUTPATIENT)
Dept: UROLOGY | Facility: CLINIC | Age: 41
End: 2021-12-07

## 2021-12-07 DIAGNOSIS — R79.89 LOW TESTOSTERONE IN MALE: Primary | ICD-10-CM

## 2021-12-07 PROCEDURE — 99213 OFFICE O/P EST LOW 20 MIN: CPT | Performed by: UROLOGY

## 2021-12-07 NOTE — PROGRESS NOTES
Follow Up Office Visit      Patient Name: Paras Freed  : 1980   MRN: [id number]     Chief Complaint:  Low Testosterone    Referring Provider: No ref. provider found     You have chosen to receive care through a telehealth visit.  Do you consent to use a video/audio connection for your medical care today? Yes    History of Present Illness: Paras Freed is a 41 y.o. male who presents today for follow up of  Hypogonadism.  During his last appointment he mentioned he had sleep apnea.  We discussed that starting TRT would worsen his sleep apnea.  He reports he is scheduled for a sleep study on Dec 11th.  He reports he feels the same as his last visit.  Fatigue is his biggest complaint.        Subjective      Review of System: Review of Systems   Constitutional: Negative for chills, fatigue, fever and unexpected weight change.   HENT: Negative for congestion, rhinorrhea and sore throat.    Eyes: Negative for visual disturbance.   Respiratory: Negative for apnea, cough, chest tightness and shortness of breath.    Cardiovascular: Negative for chest pain.   Gastrointestinal: Negative for abdominal pain, constipation, diarrhea, nausea and vomiting.   Endocrine: Negative for polydipsia and polyuria.   Genitourinary: Negative for difficulty urinating, dysuria, enuresis, flank pain, frequency, genital sores, hematuria and urgency.   Musculoskeletal: Negative for gait problem.   Skin: Negative for rash and wound.   Allergic/Immunologic: Negative for immunocompromised state.   Neurological: Negative for dizziness, tremors, syncope, weakness and light-headedness.   Hematological: Does not bruise/bleed easily.      I have reviewed the ROS documented by my clinical staff, I have updated appropriately and I agree. Karina Barnes MD    I have reviewed and the following portions of the patient's history were updated as appropriate: past family history, past medical history, past social history, past surgical history and  problem list.    Medications:     Current Outpatient Medications:   •  buPROPion XL (WELLBUTRIN XL) 150 MG 24 hr tablet, , Disp: , Rfl:   •  Cholecalciferol 1.25 MG (69156 UT) tablet, take 1 capsule only once weekly, Disp: , Rfl:   •  citalopram (CeleXA) 40 MG tablet, , Disp: , Rfl:   •  ibuprofen (ADVIL,MOTRIN) 800 MG tablet, , Disp: , Rfl:   •  Loratadine-D 12HR 5-120 MG per 12 hr tablet, , Disp: , Rfl:     Allergies:   No Known Allergies        Objective     Physical Exam:   Vital Signs: There were no vitals filed for this visit.  There is no height or weight on file to calculate BMI.     Physical Exam  Constitutional:       Appearance: Normal appearance. He is not ill-appearing.   HENT:      Head: Normocephalic and atraumatic.      Right Ear: External ear normal.      Left Ear: External ear normal.      Nose: Nose normal.   Eyes:      Extraocular Movements: Extraocular movements intact.   Pulmonary:      Effort: Pulmonary effort is normal.   Musculoskeletal:      Cervical back: Normal range of motion.   Neurological:      General: No focal deficit present.      Mental Status: He is alert.   Psychiatric:         Mood and Affect: Mood normal.         Behavior: Behavior normal.         Judgment: Judgment normal.         Labs:   Brief Urine Lab Results     None               Lab Results   Component Value Date    GLUCOSE 117 (H) 01/10/2021    CALCIUM 9.2 01/10/2021     01/10/2021    K 3.9 01/10/2021    CO2 20.5 (L) 01/10/2021     01/10/2021    BUN 16 01/10/2021    CREATININE 0.98 01/10/2021    EGFRIFNONA 85 01/10/2021    BCR 16.3 01/10/2021    ANIONGAP 12.5 01/10/2021       Lab Results   Component Value Date    WBC 6.88 11/08/2021    HGB 15.4 11/08/2021    HCT 46.7 11/08/2021    MCV 90.9 11/08/2021     11/08/2021       Lab Results   Component Value Date    PSA 0.535 11/08/2021       Images:   No Images in the past 120 days found..        Measures:   Tobacco:   Paras Freed  reports that he quit  smoking about 9 years ago. His smoking use included cigarettes. He smoked 0.50 packs per day. He has never used smokeless tobacco..      Urine Incontinence: Patient reports that he is not currently experiencing any symptoms of urinary incontinence.      Assessment / Plan      Assessment/Plan:   41 y.o. male who presented today for follow up of hypogonadism and low T.  He is scheduled for a sleep study on December 11th.  I again discussed the importance of getting his sleep apnea under control prior to starting TRT.  His T was 316 and I believe once his sleep apnea is treated there is a good chance his levels will return back to normal.  He is to call and schedule a follow up once his sleep study is complete.  I will repeat a testosterone approximately 3 months after he starts his treatment of sleep apnea.  However, if his sleep study is normal we will go ahead and start TRT.      Diagnoses and all orders for this visit:    1. Low testosterone in male (Primary)           Follow Up:   Return in about 3 months (around 3/7/2022) for Follow up with labs.    I spent approximately 20 minutes providing clinical care for this patient; including review of patient's chart and provider documentation, face to face time spent with patient in examination room (obtaining history, performing physical exam, discussing diagnosis and management options), placing orders, and completing patient documentation.     Karina Barnes MD  [facility]

## 2023-09-29 ENCOUNTER — OFFICE VISIT (OUTPATIENT)
Dept: ORTHOPEDIC SURGERY | Facility: CLINIC | Age: 43
End: 2023-09-29
Payer: COMMERCIAL

## 2023-09-29 ENCOUNTER — HOSPITAL ENCOUNTER (OUTPATIENT)
Dept: GENERAL RADIOLOGY | Facility: HOSPITAL | Age: 43
Discharge: HOME | End: 2023-09-29
Admitting: PHYSICIAN ASSISTANT
Payer: COMMERCIAL

## 2023-09-29 VITALS
BODY MASS INDEX: 34.07 KG/M2 | SYSTOLIC BLOOD PRESSURE: 112 MMHG | HEIGHT: 69 IN | HEART RATE: 69 BPM | WEIGHT: 230 LBS | DIASTOLIC BLOOD PRESSURE: 74 MMHG

## 2023-09-29 DIAGNOSIS — M75.41 IMPINGEMENT SYNDROME OF RIGHT SHOULDER: Primary | ICD-10-CM

## 2023-09-29 DIAGNOSIS — M25.511 RIGHT SHOULDER PAIN, UNSPECIFIED CHRONICITY: ICD-10-CM

## 2023-09-29 PROCEDURE — 73030 X-RAY EXAM OF SHOULDER: CPT

## 2023-09-29 RX ORDER — LAMOTRIGINE 200 MG/1
TABLET ORAL
COMMUNITY
Start: 2023-09-28

## 2023-09-29 RX ORDER — CETIRIZINE HYDROCHLORIDE 10 MG/1
1 TABLET ORAL DAILY
COMMUNITY

## 2023-09-29 RX ORDER — LIDOCAINE HYDROCHLORIDE 10 MG/ML
5 INJECTION, SOLUTION EPIDURAL; INFILTRATION; INTRACAUDAL; PERINEURAL
Status: COMPLETED | OUTPATIENT
Start: 2023-09-29 | End: 2023-09-29

## 2023-09-29 RX ORDER — METHYLPREDNISOLONE ACETATE 80 MG/ML
80 INJECTION, SUSPENSION INTRA-ARTICULAR; INTRALESIONAL; INTRAMUSCULAR; SOFT TISSUE
Status: COMPLETED | OUTPATIENT
Start: 2023-09-29 | End: 2023-09-29

## 2023-09-29 RX ORDER — HYDROXYZINE HYDROCHLORIDE 25 MG/1
TABLET, FILM COATED ORAL
COMMUNITY
Start: 2023-08-31

## 2023-09-29 RX ADMIN — METHYLPREDNISOLONE ACETATE 80 MG: 80 INJECTION, SUSPENSION INTRA-ARTICULAR; INTRALESIONAL; INTRAMUSCULAR; SOFT TISSUE at 11:41

## 2023-09-29 RX ADMIN — LIDOCAINE HYDROCHLORIDE 5 ML: 10 INJECTION, SOLUTION EPIDURAL; INFILTRATION; INTRACAUDAL; PERINEURAL at 11:41

## 2023-09-29 NOTE — PROGRESS NOTES
[facility] [place] [facility] New Patient Visit          Patient: Paras Freed  YOB: 1980  Date of Encounter: 09/29/2023  PCP: Jessica Rouse APRN      Subjective     Chief Complaint   Patient presents with    Right Shoulder - Initial Evaluation, Pain           History of Present Illness:     Paras Freed is a 43 y.o. male presents today as result of right shoulder pain approximate 8 to 9 months duration to which the patient suffered a fall landing directly on the elbow and jamming the shoulder and basicervical region of the neck.  Following this he had several days of progressive soreness and pain and difficulty with range of motion.  Following this he has had continued pain radiating to the lateral aspect of the shoulder and into the mid upper arm.  He reports majority of his pain does not past the elbow however he does have longstanding cervical disc radiculopathy symptoms with numbness and tingling into the fourth and fifth digits bilaterally.  He reports this pain feels different from the previously described.  He has undergone no conservative treatment options outside of occasional subtherapeutic anti-inflammatory medication.  He reports dull throbbing aching sensation to the lateral aspect of the shoulder radiating to the mid upper arm he has difficulty upon repetitive overhead activities which seem to exacerbate his pain as well as his current occupation.         Patient Active Problem List   Diagnosis    Mixed anxiety and depressive disorder     Past Medical History:   Diagnosis Date    Arthritis of back Several years    Back pain     Blood in stool     Rotator cuff syndrome 8months    Tendinitis of knee 30 years     Past Surgical History:   Procedure Laterality Date    HERNIA REPAIR      TONSILLECTOMY      VASECTOMY       Social History     Occupational History    Not on file   Tobacco Use    Smoking status: Former     Packs/day: 0.50     Types: Cigarettes     Quit date: 2012     Years  since quittin.7    Smokeless tobacco: Never   Vaping Use    Vaping Use: Never used   Substance and Sexual Activity    Alcohol use: Not Currently    Drug use: Not Currently    Sexual activity: Dejuan Freed  reports that he quit smoking about 11 years ago. His smoking use included cigarettes. He smoked an average of .5 packs per day. He has never used smokeless tobacco.. I have educated him on the risk of diseases from using tobacco products such as cancer, COPD, and heart disease.           Social History     Social History Narrative    Not on file     Family History   Problem Relation Age of Onset    Other Father     Hypertension Father     Heart disease Father     Cancer Maternal Grandmother     Diabetes Paternal Grandmother     Heart disease Paternal Grandfather     Diabetes Paternal Grandfather      Current Outpatient Medications   Medication Sig Dispense Refill    buPROPion XL (WELLBUTRIN XL) 150 MG 24 hr tablet       cetirizine (zyrTEC) 10 MG tablet Take 1 tablet by mouth Daily.      Cholecalciferol 1.25 MG (83239 UT) tablet take 1 capsule only once weekly      hydrOXYzine (ATARAX) 25 MG tablet TAKE 1 TABLET BY MOUTH THREE TIMES DAILY AS NEEDED FOR ANXIETY, ITCHING, OR DIFFICULTY SLEEPING      ibuprofen (ADVIL,MOTRIN) 800 MG tablet       lamoTRIgine (LaMICtal) 200 MG tablet       citalopram (CeleXA) 40 MG tablet  (Patient not taking: Reported on 2023)      Loratadine-D 12HR 5-120 MG per 12 hr tablet  (Patient not taking: Reported on 2023)       No current facility-administered medications for this visit.     No Known Allergies         Review of Systems   Constitutional: Negative.   HENT: Negative.     Eyes: Negative.    Cardiovascular: Negative.    Respiratory: Negative.     Endocrine: Negative.    Hematologic/Lymphatic: Negative.    Skin: Negative.    Musculoskeletal:         Pertinent positives listed in HPI   Gastrointestinal: Negative.    Genitourinary: Negative.    Neurological:   "Positive for numbness.   Psychiatric/Behavioral:  Positive for depression. The patient is nervous/anxious.    Allergic/Immunologic: Negative.        Objective      Vitals:    09/29/23 0956   BP: 112/74   Pulse: 69   Weight: 104 kg (230 lb)   Height: 175.3 cm (69\")             Physical Exam  Vitals and nursing note reviewed.   Constitutional:       General: He is not in acute distress.     Appearance: Normal appearance. He is not ill-appearing.   HENT:      Head: Normocephalic and atraumatic.      Right Ear: External ear normal.      Left Ear: External ear normal.      Nose: Nose normal.      Mouth/Throat:      Mouth: Mucous membranes are moist.      Pharynx: Oropharynx is clear.   Eyes:      Extraocular Movements: Extraocular movements intact.      Conjunctiva/sclera: Conjunctivae normal.      Pupils: Pupils are equal, round, and reactive to light.   Cardiovascular:      Rate and Rhythm: Normal rate.      Pulses: Normal pulses.   Pulmonary:      Effort: Pulmonary effort is normal.   Abdominal:      General: There is no distension.   Musculoskeletal:      Cervical back: Normal range of motion. Spasms and tenderness present. No rigidity. Pain with movement present. Decreased range of motion.      Comments: Right shoulder on examination today patient exhibits forward painful flexion and abduction greater than 100 degrees.  Patient has Jobes maneuver with speeds test painful strength intact.  Hernandez and Neer's painful.  Hester's and Jurgenson's test negative.  Empty can test painful with strength intact.  External rotation at approximately 40 degrees at side with oppositional rotation intact.  Internal rotation at side to the lower lumbar region comparable to contralateral shoulder.  Neurovascular status grossly intact right upper extremity.   Skin:     General: Skin is warm and dry.      Capillary Refill: Capillary refill takes less than 2 seconds.   Neurological:      General: No focal deficit present.      Mental " Status: He is alert and oriented to person, place, and time.   Psychiatric:         Mood and Affect: Mood normal.         Behavior: Behavior normal.               Radiology:      Review of MRI from outside source 8/10/2023 reveals there is evidence of superior labral lymphedema versus SLAP tear.  Subacromial impingement of the supraspinatus tendon with tendinopathy.  Acromioclavicular joint arthropathy.  There is moderate fluid in the biceps tendon sheath with intermediate signal of the tendon Limited evaluation of the superior labrum with diffuse altered signal.        Assessment/Plan        ICD-10-CM ICD-9-CM   1. Right shoulder pain, unspecified chronicity  M25.511 719.41   2. Impingement syndrome of right shoulder  M75.41 726.2       43-year-old male with several month history of an acute traumatic onset injury which patient suffered exacerbation of right shoulder subacromial/subdeltoid bursitis with impingement and questionable labral SLAP tear.  Further discussion was had with the patient and currently the patient was provided with a subacromial steroid injection 80 mg Depo-Medrol with lidocaine block injected into the intra-articular space of the right shoulder.  The patient tolerated this procedure well.  He was instructed to return back in 3 weeks for further evaluation.  We discussed possibility of formal outpatient physical therapy versus other conservative treatment options.  In reference to the patient's neck we will continue to monitor as a source behind however this seems to be chronic and latent and intermittent in nature.  We discussed possibility of further conservative treatment options as the patient does not appear to be a direct surgical indication currently.      Large Joint Arthrocentesis: R subacromial bursa  Date/Time: 9/29/2023 11:41 AM  Consent given by: patient  Site marked: site marked  Supporting Documentation  Indications: pain and diagnostic evaluation   Procedure Details  Location:  shoulder - R subacromial bursa  Needle size: 25 G  Approach: lateral  Medications administered: 80 mg methylPREDNISolone acetate 80 MG/ML; 5 mL lidocaine PF 1% 1 %  Patient tolerance: patient tolerated the procedure well with no immediate complications                    This document was signed by Daniel Nowak PA-C September 29, 2023     CC: Jessica Rouse APRN      Dictated Utilizing Dragon Dictation:   Please note that portions of this note were completed with a voice recognition program.   Part of this note may be an electronic transcription/translation of spoken language to printed text using the Dragon Dictation System.

## 2023-10-11 ENCOUNTER — LAB (OUTPATIENT)
Dept: LAB | Facility: HOSPITAL | Age: 43
End: 2023-10-11
Payer: COMMERCIAL

## 2023-10-11 DIAGNOSIS — R73.09 ABNORMAL GLUCOSE: ICD-10-CM

## 2023-10-11 DIAGNOSIS — R53.83 FATIGUE, UNSPECIFIED TYPE: ICD-10-CM

## 2023-10-11 LAB
BASOPHILS # BLD AUTO: 0.04 10*3/MM3 (ref 0–0.2)
BASOPHILS NFR BLD AUTO: 0.5 % (ref 0–1.5)
DEPRECATED RDW RBC AUTO: 40.9 FL (ref 37–54)
EOSINOPHIL # BLD AUTO: 0.04 10*3/MM3 (ref 0–0.4)
EOSINOPHIL NFR BLD AUTO: 0.5 % (ref 0.3–6.2)
ERYTHROCYTE [DISTWIDTH] IN BLOOD BY AUTOMATED COUNT: 12.4 % (ref 12.3–15.4)
HBA1C MFR BLD: 5.3 % (ref 4.8–5.6)
HCT VFR BLD AUTO: 46.3 % (ref 37.5–51)
HGB BLD-MCNC: 15.5 G/DL (ref 13–17.7)
IMM GRANULOCYTES # BLD AUTO: 0.01 10*3/MM3 (ref 0–0.05)
IMM GRANULOCYTES NFR BLD AUTO: 0.1 % (ref 0–0.5)
LYMPHOCYTES # BLD AUTO: 2.04 10*3/MM3 (ref 0.7–3.1)
LYMPHOCYTES NFR BLD AUTO: 26.7 % (ref 19.6–45.3)
MCH RBC QN AUTO: 30.4 PG (ref 26.6–33)
MCHC RBC AUTO-ENTMCNC: 33.5 G/DL (ref 31.5–35.7)
MCV RBC AUTO: 90.8 FL (ref 79–97)
MONOCYTES # BLD AUTO: 0.51 10*3/MM3 (ref 0.1–0.9)
MONOCYTES NFR BLD AUTO: 6.7 % (ref 5–12)
NEUTROPHILS NFR BLD AUTO: 5 10*3/MM3 (ref 1.7–7)
NEUTROPHILS NFR BLD AUTO: 65.5 % (ref 42.7–76)
NRBC BLD AUTO-RTO: 0 /100 WBC (ref 0–0.2)
PLATELET # BLD AUTO: 310 10*3/MM3 (ref 140–450)
PMV BLD AUTO: 8.6 FL (ref 6–12)
RBC # BLD AUTO: 5.1 10*6/MM3 (ref 4.14–5.8)
WBC NRBC COR # BLD: 7.64 10*3/MM3 (ref 3.4–10.8)

## 2023-10-11 PROCEDURE — 83036 HEMOGLOBIN GLYCOSYLATED A1C: CPT

## 2023-10-11 PROCEDURE — 80050 GENERAL HEALTH PANEL: CPT

## 2023-10-11 PROCEDURE — 84436 ASSAY OF TOTAL THYROXINE: CPT

## 2023-10-11 PROCEDURE — 36415 COLL VENOUS BLD VENIPUNCTURE: CPT

## 2023-10-11 PROCEDURE — 84479 ASSAY OF THYROID (T3 OR T4): CPT

## 2023-10-12 LAB
ALBUMIN SERPL-MCNC: 4.6 G/DL (ref 3.5–5.2)
ALBUMIN/GLOB SERPL: 1.8 G/DL
ALP SERPL-CCNC: 81 U/L (ref 39–117)
ALT SERPL W P-5'-P-CCNC: 20 U/L (ref 1–41)
ANION GAP SERPL CALCULATED.3IONS-SCNC: 12.7 MMOL/L (ref 5–15)
AST SERPL-CCNC: 21 U/L (ref 1–40)
BILIRUB SERPL-MCNC: 0.8 MG/DL (ref 0–1.2)
BUN SERPL-MCNC: 16 MG/DL (ref 6–20)
BUN/CREAT SERPL: 14.3 (ref 7–25)
CALCIUM SPEC-SCNC: 9.8 MG/DL (ref 8.6–10.5)
CHLORIDE SERPL-SCNC: 105 MMOL/L (ref 98–107)
CO2 SERPL-SCNC: 23.3 MMOL/L (ref 22–29)
CREAT SERPL-MCNC: 1.12 MG/DL (ref 0.76–1.27)
EGFRCR SERPLBLD CKD-EPI 2021: 84.1 ML/MIN/1.73
GLOBULIN UR ELPH-MCNC: 2.5 GM/DL
GLUCOSE SERPL-MCNC: 106 MG/DL (ref 65–99)
POTASSIUM SERPL-SCNC: 3.5 MMOL/L (ref 3.5–5.2)
PROT SERPL-MCNC: 7.1 G/DL (ref 6–8.5)
SODIUM SERPL-SCNC: 141 MMOL/L (ref 136–145)
T-UPTAKE NFR SERPL: 0.99 TBI (ref 0.8–1.3)
T4 SERPL-MCNC: 8.44 MCG/DL (ref 4.5–11.7)
TSH SERPL DL<=0.05 MIU/L-ACNC: 0.95 UIU/ML (ref 0.27–4.2)

## 2023-11-10 ENCOUNTER — OFFICE VISIT (OUTPATIENT)
Dept: ORTHOPEDIC SURGERY | Facility: CLINIC | Age: 43
End: 2023-11-10
Payer: COMMERCIAL

## 2023-11-10 VITALS — BODY MASS INDEX: 33.96 KG/M2 | HEIGHT: 69 IN | WEIGHT: 229.28 LBS

## 2023-11-10 DIAGNOSIS — M75.41 IMPINGEMENT SYNDROME OF RIGHT SHOULDER: ICD-10-CM

## 2023-11-10 DIAGNOSIS — M25.511 RIGHT SHOULDER PAIN, UNSPECIFIED CHRONICITY: Primary | ICD-10-CM

## 2023-11-10 DIAGNOSIS — M54.2 CERVICALGIA: ICD-10-CM

## 2023-11-10 PROCEDURE — 99213 OFFICE O/P EST LOW 20 MIN: CPT | Performed by: PHYSICIAN ASSISTANT

## 2023-11-10 RX ORDER — METHYLPREDNISOLONE 4 MG/1
TABLET ORAL
Qty: 21 TABLET | Refills: 0 | Status: SHIPPED | OUTPATIENT
Start: 2023-11-10

## 2023-11-10 NOTE — PROGRESS NOTES
[facility] [place] [facility] Established Patient Visit          Patient: Paras Freed  YOB: 1980  Date of Encounter: 11/10/2023  PCP: Jessica Rouse APRN      Subjective     Chief Complaint   Patient presents with    Right Shoulder - Pain, Follow-up           History of Present Illness:     Paras Freed is a 43 y.o. male presents today as result of right shoulder pain approximate nearly 10 months duration to which the patient suffered a fall landing directly on the elbow and jamming the shoulder and basicervical region of the neck.  Patient has responded well to conservative treatment options with continuation of paracervical region and AC joint change.  Prior MRI revealed no direct surgical indication with supraspinatus tendinosis and biceps tendinosis with AC joint arthropathy.  As result of patient's findings as well as notable symptoms and continuation of pain patient would like to be seen for further evaluation and discussion of possibility of further conservative treatment options.  Patient reports no new findings or symptoms today.  Denies any paresthesias currently.         Patient Active Problem List   Diagnosis    Mixed anxiety and depressive disorder     Past Medical History:   Diagnosis Date    Arthritis of back Several years    Back pain     Blood in stool     Rotator cuff syndrome 8months    Tendinitis of knee 30 years     Past Surgical History:   Procedure Laterality Date    HERNIA REPAIR      TONSILLECTOMY      VASECTOMY       Social History     Occupational History    Not on file   Tobacco Use    Smoking status: Former     Packs/day: .5     Types: Cigarettes     Quit date:      Years since quittin.8    Smokeless tobacco: Never   Vaping Use    Vaping Use: Never used   Substance and Sexual Activity    Alcohol use: Not Currently    Drug use: Not Currently    Sexual activity: Defer    Paras Freed  reports that he quit smoking about 11 years ago. His smoking use included  "cigarettes. He smoked an average of .5 packs per day. He has never used smokeless tobacco.. I have educated him on the risk of diseases from using tobacco products such as cancer, COPD, and heart disease.           Social History     Social History Narrative    Not on file     Family History   Problem Relation Age of Onset    Other Father     Hypertension Father     Heart disease Father     Cancer Maternal Grandmother     Diabetes Paternal Grandmother     Heart disease Paternal Grandfather     Diabetes Paternal Grandfather      Current Outpatient Medications   Medication Sig Dispense Refill    buPROPion XL (WELLBUTRIN XL) 150 MG 24 hr tablet       cetirizine (zyrTEC) 10 MG tablet Take 1 tablet by mouth Daily.      Cholecalciferol 1.25 MG (06059 UT) tablet take 1 capsule only once weekly      citalopram (CeleXA) 40 MG tablet       hydrOXYzine (ATARAX) 25 MG tablet TAKE 1 TABLET BY MOUTH THREE TIMES DAILY AS NEEDED FOR ANXIETY, ITCHING, OR DIFFICULTY SLEEPING      ibuprofen (ADVIL,MOTRIN) 800 MG tablet       lamoTRIgine (LaMICtal) 200 MG tablet       Loratadine-D 12HR 5-120 MG per 12 hr tablet       methylPREDNISolone (MEDROL) 4 MG dose pack Use as directed by package instructions 21 tablet 0     No current facility-administered medications for this visit.     No Known Allergies         Review of Systems   Constitutional: Negative.   HENT: Negative.     Eyes: Negative.    Cardiovascular: Negative.    Respiratory: Negative.     Endocrine: Negative.    Hematologic/Lymphatic: Negative.    Skin: Negative.    Musculoskeletal:         Pertinent positives listed in HPI   Gastrointestinal: Negative.    Genitourinary: Negative.    Neurological:  Positive for numbness.   Psychiatric/Behavioral:  Positive for depression. The patient is nervous/anxious.    Allergic/Immunologic: Negative.          Objective      Vitals:    11/10/23 1126   Weight: 104 kg (229 lb 4.5 oz)   Height: 175.3 cm (69.02\")             Physical Exam  Vitals " and nursing note reviewed.   Constitutional:       General: He is not in acute distress.     Appearance: Normal appearance. He is not ill-appearing.   HENT:      Head: Normocephalic and atraumatic.      Right Ear: External ear normal.      Left Ear: External ear normal.      Nose: Nose normal.      Mouth/Throat:      Mouth: Mucous membranes are moist.      Pharynx: Oropharynx is clear.   Eyes:      Extraocular Movements: Extraocular movements intact.      Conjunctiva/sclera: Conjunctivae normal.      Pupils: Pupils are equal, round, and reactive to light.   Cardiovascular:      Rate and Rhythm: Normal rate.      Pulses: Normal pulses.   Pulmonary:      Effort: Pulmonary effort is normal.   Abdominal:      General: There is no distension.   Musculoskeletal:      Cervical back: Normal range of motion. Spasms and tenderness present. No rigidity. Pain with movement present. Decreased range of motion.      Comments: Right shoulder on examination today patient exhibits forward painful flexion and abduction greater than 100 degrees.  Patient has Jobes maneuver with speeds test painful strength intact.  Hernandez and Neer's painful.  Hitchcock's and Jurgenson's test negative.  Empty can test painful with strength intact.  External rotation at approximately 40 degrees at side with oppositional rotation intact.  Internal rotation at side to the lower lumbar region comparable to contralateral shoulder.  Neurovascular status grossly intact right upper extremity.   Skin:     General: Skin is warm and dry.      Capillary Refill: Capillary refill takes less than 2 seconds.   Neurological:      General: No focal deficit present.      Mental Status: He is alert and oriented to person, place, and time.   Psychiatric:         Mood and Affect: Mood normal.         Behavior: Behavior normal.                 Radiology:      Review of MRI from outside source 8/10/2023 reveals there is evidence of superior labral lymphedema versus SLAP tear.   Subacromial impingement of the supraspinatus tendon with tendinopathy.  Acromioclavicular joint arthropathy.  There is moderate fluid in the biceps tendon sheath with intermediate signal of the tendon Limited evaluation of the superior labrum with diffuse altered signal.        Assessment/Plan        ICD-10-CM ICD-9-CM   1. Right shoulder pain, unspecified chronicity  M25.511 719.41   2. Impingement syndrome of right shoulder  M75.41 726.2   3. Cervicalgia  M54.2 723.1       43-year-old male with notable several month history of an acute traumatic onset right shoulder pain and symptoms consistent with subacromial impingement and tendinopathy with AC joint arthropathy and bicipital tendinosis proximally.  There is also limited evaluation of the superior labral with altered signal.  Patient has responded in the past with conservative treatment and has continuation of his pain and symptoms is noted the patient was provided today with a Medrol Dosepak to be taken as directed as he is still continuing to have some residual paracervical region pain as well.  Patient will return back in 3 weeks for further evaluation of the efficacy of conservative treatment.  No direct surgical indication.  Therapy exercises as indicated            This document was signed by Daniel Nowak PA-C November 10, 2023    CC: Jessica Rouse APRN      Dictated Utilizing Dragon Dictation:   Please note that portions of this note were completed with a voice recognition program.   Part of this note may be an electronic transcription/translation of spoken language to printed text using the Dragon Dictation System.

## 2023-12-22 ENCOUNTER — OFFICE VISIT (OUTPATIENT)
Dept: ORTHOPEDIC SURGERY | Facility: CLINIC | Age: 43
End: 2023-12-22
Payer: COMMERCIAL

## 2023-12-22 VITALS — BODY MASS INDEX: 33.92 KG/M2 | WEIGHT: 229 LBS | HEIGHT: 69 IN

## 2023-12-22 DIAGNOSIS — M75.41 IMPINGEMENT SYNDROME OF RIGHT SHOULDER: ICD-10-CM

## 2023-12-22 DIAGNOSIS — M25.511 RIGHT SHOULDER PAIN, UNSPECIFIED CHRONICITY: ICD-10-CM

## 2023-12-22 DIAGNOSIS — M54.2 CERVICALGIA: Primary | ICD-10-CM

## 2023-12-22 PROCEDURE — 99213 OFFICE O/P EST LOW 20 MIN: CPT | Performed by: PHYSICIAN ASSISTANT

## 2023-12-22 NOTE — PROGRESS NOTES
[facility] Established Patient Visit          Patient: Paras Freed  YOB: 1980  Date of Encounter: 2023  PCP: Jessica Rouse APRN      Subjective     Chief Complaint   Patient presents with    Right Shoulder - Follow-up, Pain           History of Present Illness:     Paras Freed is a 43 y.o. male presents today for follow-up evaluation continuation right shoulder impingement with some degree of cervicalgia and right-sided cervical radicular component pain and symptoms.  The patient has been struggling with this for approximately 1 year.  He has undergone steroid oral subacromial injection with some resolution of his right shoulder pain as well as a Medrol Dosepak which seemed to temporarily alleviate his basicervical region pain and pain into the right upper extremity.  Patient has noticed that the right upper extremity has returned in terms of his pain and symptoms since the Medrol Dosepak is worn off.  He continues to report sharp stabbing sensation as well as pain and numbness and tingling into the right upper extremity.       Patient Active Problem List   Diagnosis    Mixed anxiety and depressive disorder     Past Medical History:   Diagnosis Date    Arthritis of back Several years    Back pain     Blood in stool     Rotator cuff syndrome 8months    Tendinitis of knee 30 years     Past Surgical History:   Procedure Laterality Date    HERNIA REPAIR      TONSILLECTOMY      VASECTOMY       Social History     Occupational History    Not on file   Tobacco Use    Smoking status: Former     Packs/day: .5     Types: Cigarettes     Quit date:      Years since quittin.9    Smokeless tobacco: Never   Vaping Use    Vaping Use: Never used   Substance and Sexual Activity    Alcohol use: Not Currently    Drug use: Not Currently    Sexual activity: Defer    Paras Freed  reports that he quit smoking about 11 years ago. His smoking use included cigarettes. He smoked an  "average of .5 packs per day. He has never used smokeless tobacco.. I have educated him on the risk of diseases from using tobacco products such as cancer, COPD, and heart disease.           Social History     Social History Narrative    Not on file     Family History   Problem Relation Age of Onset    Other Father     Hypertension Father     Heart disease Father     Cancer Maternal Grandmother     Diabetes Paternal Grandmother     Heart disease Paternal Grandfather     Diabetes Paternal Grandfather      Current Outpatient Medications   Medication Sig Dispense Refill    buPROPion XL (WELLBUTRIN XL) 150 MG 24 hr tablet       cetirizine (zyrTEC) 10 MG tablet Take 1 tablet by mouth Daily.      Cholecalciferol 1.25 MG (67832 UT) tablet take 1 capsule only once weekly      citalopram (CeleXA) 40 MG tablet       hydrOXYzine (ATARAX) 25 MG tablet TAKE 1 TABLET BY MOUTH THREE TIMES DAILY AS NEEDED FOR ANXIETY, ITCHING, OR DIFFICULTY SLEEPING      ibuprofen (ADVIL,MOTRIN) 800 MG tablet       lamoTRIgine (LaMICtal) 200 MG tablet       Loratadine-D 12HR 5-120 MG per 12 hr tablet       methylPREDNISolone (MEDROL) 4 MG dose pack Use as directed by package instructions 21 tablet 0     No current facility-administered medications for this visit.     No Known Allergies         Review of Systems   Constitutional: Negative.   HENT: Negative.     Eyes: Negative.    Cardiovascular: Negative.    Respiratory: Negative.     Endocrine: Negative.    Hematologic/Lymphatic: Negative.    Skin: Negative.    Musculoskeletal:         Pertinent positives listed in HPI   Gastrointestinal: Negative.    Genitourinary: Negative.    Neurological:  Positive for numbness.   Psychiatric/Behavioral:  Positive for depression. The patient is nervous/anxious.    Allergic/Immunologic: Negative.          Objective      Vitals:    12/22/23 0824   Weight: 104 kg (229 lb)   Height: 175.3 cm (69\")             Physical Exam  Vitals and nursing note reviewed. "   Constitutional:       General: He is not in acute distress.     Appearance: Normal appearance. He is not ill-appearing.   HENT:      Head: Normocephalic and atraumatic.      Right Ear: External ear normal.      Left Ear: External ear normal.      Nose: Nose normal.      Mouth/Throat:      Mouth: Mucous membranes are moist.      Pharynx: Oropharynx is clear.   Eyes:      Extraocular Movements: Extraocular movements intact.      Conjunctiva/sclera: Conjunctivae normal.      Pupils: Pupils are equal, round, and reactive to light.   Cardiovascular:      Rate and Rhythm: Normal rate.      Pulses: Normal pulses.   Pulmonary:      Effort: Pulmonary effort is normal.   Abdominal:      General: There is no distension.   Musculoskeletal:      Cervical back: Normal range of motion. Spasms and tenderness present. No rigidity. Pain with movement present. Decreased range of motion.      Comments: Right shoulder on examination today patient exhibits forward painful flexion and abduction greater than 100 degrees.  Patient has Jobes maneuver with speeds test painful strength intact.  Hernandez and Neer's painful.  Jayuya's and Jurgenson's test negative.  Empty can test painful with strength intact.  External rotation at approximately 40 degrees at side with oppositional rotation intact.  Internal rotation at side to the lower lumbar region comparable to contralateral shoulder.  Neurovascular status grossly intact right upper extremity.   Skin:     General: Skin is warm and dry.      Capillary Refill: Capillary refill takes less than 2 seconds.   Neurological:      General: No focal deficit present.      Mental Status: He is alert and oriented to person, place, and time.   Psychiatric:         Mood and Affect: Mood normal.         Behavior: Behavior normal.                 Radiology:      Review of MRI from outside source 8/10/2023 reveals there is evidence of superior labral lymphedema versus SLAP tear.  Subacromial impingement of  the supraspinatus tendon with tendinopathy.  Acromioclavicular joint arthropathy.  There is moderate fluid in the biceps tendon sheath with intermediate signal of the tendon Limited evaluation of the superior labrum with diffuse altered signal.      Study Result    Narrative & Impression   MRI CERVICAL SPINE WO CONTRAST-     CLINICAL INDICATION: NECK PAIN; M54.2-Cervicalgia          COMPARISON: None immediately available     TECHNIQUE: Sagittal spin echo scans were obtained from the posterior  fossa through the upper thoracic spine and axial scans were performed  through selected cervical disc space levels, utilizing both spin echo  and gradient echo scans technique without contrast administration.      FINDINGS:   Alignment is anatomic.   The vertebral body heights are adequately maintained.  The signal characteristics of the vertebral bodies are as expected on  the presented pulse sequences.     The disc space heights are preserved.  Tiny central disc bulge at C2-3 which slightly indents the cerebral  spinal fluid space     Slightly broader disc bulge at C6-7 but no significant narrowing at  either level.  No evidence of spinal stenosis or foraminal stenosis.     There is no abnormal cord signal.             IMPRESSION:  Tiny central disc bulge at C2-3 and somewhat broader disc bulge at C6-7          This report was finalized on 8/25/2016 3:50 PM by Dr. Brian Barrow MD.            Assessment/Plan        ICD-10-CM ICD-9-CM   1. Cervicalgia  M54.2 723.1   2. Impingement syndrome of right shoulder  M75.41 726.2   3. Right shoulder pain, unspecified chronicity  M25.511 719.41       43-year-old male with notable several month history of an acute traumatic onset right shoulder pain and symptoms consistent with subacromial impingement and tendinopathy with AC joint arthropathy and bicipital tendinosis proximally.  There is also limited evaluation of the superior labral with altered signal.  Patient has responded in the  past with conservative treatment and has continuation of his pain and symptoms is noted the patient was provided today with a Medrol Dosepak to be taken as directed as he is still continuing to have some residual paracervical region pain as well.  He is still continuing to see a combination of the 2 in reference to the paracervical region radiculopathy pain as well as the shoulder joint proper isolated rotator cuff tendinitis/subacromial bursitis.  As result the patient was provided with an order for formal outpatient physical therapy to address both the cervical spine as well as the right shoulder modalities as deemed appropriate.  The patient was instructed to return back in 4 weeks for further evaluation.  If no significant alleviation we discussed possibility of further updated diagnostic imaging MRI cervical spine.             This document was signed by Daniel Nowak PA-C December 22, 2023    CC: Jessica Rouse APRN      Dictated Utilizing Dragon Dictation:   Please note that portions of this note were completed with a voice recognition program.   Part of this note may be an electronic transcription/translation of spoken language to printed text using the Dragon Dictation System.

## 2024-01-09 ENCOUNTER — HOSPITAL ENCOUNTER (EMERGENCY)
Facility: HOSPITAL | Age: 44
Discharge: HOME | End: 2024-01-09
Attending: STUDENT IN AN ORGANIZED HEALTH CARE EDUCATION/TRAINING PROGRAM | Admitting: STUDENT IN AN ORGANIZED HEALTH CARE EDUCATION/TRAINING PROGRAM
Payer: COMMERCIAL

## 2024-01-09 ENCOUNTER — APPOINTMENT (OUTPATIENT)
Dept: GENERAL RADIOLOGY | Facility: HOSPITAL | Age: 44
End: 2024-01-09
Payer: COMMERCIAL

## 2024-01-09 VITALS
BODY MASS INDEX: 34.07 KG/M2 | WEIGHT: 230 LBS | RESPIRATION RATE: 18 BRPM | HEART RATE: 67 BPM | TEMPERATURE: 97.6 F | DIASTOLIC BLOOD PRESSURE: 89 MMHG | SYSTOLIC BLOOD PRESSURE: 163 MMHG | OXYGEN SATURATION: 93 % | HEIGHT: 69 IN

## 2024-01-09 DIAGNOSIS — S46.912A STRAIN OF LEFT SHOULDER, INITIAL ENCOUNTER: Primary | ICD-10-CM

## 2024-01-09 PROCEDURE — 99283 EMERGENCY DEPT VISIT LOW MDM: CPT

## 2024-01-09 PROCEDURE — 73000 X-RAY EXAM OF COLLAR BONE: CPT

## 2024-01-09 PROCEDURE — 73030 X-RAY EXAM OF SHOULDER: CPT

## 2024-01-09 PROCEDURE — 25010000002 KETOROLAC TROMETHAMINE PER 15 MG: Performed by: PHYSICIAN ASSISTANT

## 2024-01-09 PROCEDURE — 96372 THER/PROPH/DIAG INJ SC/IM: CPT

## 2024-01-09 RX ORDER — KETOROLAC TROMETHAMINE 10 MG/1
10 TABLET, FILM COATED ORAL EVERY 6 HOURS PRN
Qty: 20 TABLET | Refills: 0 | Status: SHIPPED | OUTPATIENT
Start: 2024-01-09 | End: 2024-01-14

## 2024-01-09 RX ORDER — KETOROLAC TROMETHAMINE 30 MG/ML
60 INJECTION, SOLUTION INTRAMUSCULAR; INTRAVENOUS ONCE
Status: COMPLETED | OUTPATIENT
Start: 2024-01-09 | End: 2024-01-09

## 2024-01-09 RX ORDER — HYDROCODONE BITARTRATE AND ACETAMINOPHEN 5; 325 MG/1; MG/1
1 TABLET ORAL EVERY 6 HOURS PRN
Qty: 12 TABLET | Refills: 0 | Status: SHIPPED | OUTPATIENT
Start: 2024-01-09 | End: 2024-01-12

## 2024-01-09 RX ADMIN — KETOROLAC TROMETHAMINE 60 MG: 60 INJECTION, SOLUTION INTRAMUSCULAR at 18:39

## 2024-01-09 NOTE — ED PROVIDER NOTES
Subjective   History of Present Illness  This is a 43 year old male patient who presents to the ER with chief complaint of left shoulder injury. PMH significant for drug abuse (clean for 10 years), depression and anxiety. Today, patient injured his left shoulder while working on drywall. He now has significant pain, swelling and limited mobility.       Review of Systems   Constitutional: Negative.  Negative for fever.   HENT: Negative.     Respiratory: Negative.     Cardiovascular: Negative.  Negative for chest pain.   Gastrointestinal: Negative.  Negative for abdominal pain.   Endocrine: Negative.    Genitourinary: Negative.  Negative for dysuria.   Musculoskeletal:  Negative for arthralgias, back pain, gait problem, joint swelling, myalgias, neck pain and neck stiffness.        Left shoulder injury    Skin: Negative.    Neurological: Negative.    Psychiatric/Behavioral: Negative.     All other systems reviewed and are negative.      Past Medical History:   Diagnosis Date    Arthritis of back Several years    Back pain     Blood in stool     Rotator cuff syndrome 8months    Tendinitis of knee 30 years       No Known Allergies    Past Surgical History:   Procedure Laterality Date    HERNIA REPAIR      TONSILLECTOMY      VASECTOMY         Family History   Problem Relation Age of Onset    Other Father     Hypertension Father     Heart disease Father     Cancer Maternal Grandmother     Diabetes Paternal Grandmother     Heart disease Paternal Grandfather     Diabetes Paternal Grandfather        Social History     Socioeconomic History    Marital status:    Tobacco Use    Smoking status: Former     Packs/day: .5     Types: Cigarettes     Quit date:      Years since quittin.0    Smokeless tobacco: Never   Vaping Use    Vaping Use: Never used   Substance and Sexual Activity    Alcohol use: Not Currently    Drug use: Not Currently    Sexual activity: Defer           Objective   Physical Exam  Vitals and  nursing note reviewed.   Constitutional:       General: He is not in acute distress.     Appearance: He is well-developed. He is not diaphoretic.   HENT:      Head: Normocephalic and atraumatic.      Right Ear: External ear normal.      Left Ear: External ear normal.      Nose: Nose normal.   Eyes:      Conjunctiva/sclera: Conjunctivae normal.      Pupils: Pupils are equal, round, and reactive to light.   Neck:      Vascular: No JVD.      Trachea: No tracheal deviation.   Cardiovascular:      Rate and Rhythm: Normal rate and regular rhythm.      Heart sounds: Normal heart sounds. No murmur heard.  Pulmonary:      Effort: Pulmonary effort is normal. No respiratory distress.      Breath sounds: Normal breath sounds. No wheezing.   Abdominal:      General: Bowel sounds are normal.      Palpations: Abdomen is soft.      Tenderness: There is no abdominal tenderness.   Musculoskeletal:         General: Swelling, tenderness and signs of injury present. No deformity.      Cervical back: Normal range of motion and neck supple.      Comments: Left shoulder skin intact with no bruising or abrasion. Edema and tenderness to palpation. Limited, painful ROM. Neurovascular status and sensation LUE intact.    Skin:     General: Skin is warm and dry.      Coloration: Skin is not pale.      Findings: No erythema or rash.   Neurological:      Mental Status: He is alert and oriented to person, place, and time.      Cranial Nerves: No cranial nerve deficit.   Psychiatric:         Behavior: Behavior normal.         Thought Content: Thought content normal.         Splint - Cast - Strapping    Date/Time: 1/9/2024 6:58 PM    Performed by: Shelley Schrader PA  Authorized by: Jim Stephenson DO    Consent:     Consent obtained:  Verbal    Consent given by:  Patient    Risks, benefits, and alternatives were discussed: yes      Risks discussed:  Discoloration, numbness, pain and swelling    Alternatives discussed:  Referral, observation,  alternative treatment, delayed treatment and no treatment  Universal protocol:     Imaging studies available: yes      Patient identity confirmed:  Verbally with patient, arm band and hospital-assigned identification number  Pre-procedure details:     Distal neurologic exam:  Normal    Distal perfusion: distal pulses strong    Procedure details:     Location:  Shoulder    Shoulder location:  L shoulder    Supplies:  Sling    Attestation: Splint applied and adjusted personally by me    Post-procedure details:     Distal neurologic exam:  Normal    Distal perfusion: distal pulses strong      Procedure completion:  Tolerated well, no immediate complications    Post-procedure imaging: not applicable               ED Course  ED Course as of 01/09/24 1922 Tue Jan 09, 2024 1855 XR Clavicle Left  IMPRESSION:  No fracture.        This report was finalized on 1/9/2024 6:52 PM by Dr. Allison Gunter MD   [MM]   1857 XR Shoulder 2+ View Left  IMPRESSION:  Negative study.     This report was finalized on 1/9/2024 6:53 PM by Dr. Allison Gunter MD   [MM]   1904 Patient diagnosed with left shoulder strain. Will be d/c home to f/u with Dr. Flynn (his preference). Will be given rx for toradol and norco. Will f/u with ortho surgery or return to ER if symptoms worsen.  [MM]      ED Course User Index  [MM] Shelley Schrader PA                                             Medical Decision Making    This is a 43 year old male patient who presents to the ER with chief complaint of left shoulder injury. PMH significant for drug abuse (clean for 10 years), depression and anxiety. Today, patient injured his left shoulder while working on drywall. He now has significant pain, swelling and limited mobility.       Amount and/or Complexity of Data Reviewed  Radiology: ordered. Decision-making details documented in ED Course.    Risk  Prescription drug management.        Final diagnoses:   Strain of left shoulder, initial encounter       ED  Disposition  ED Disposition       ED Disposition   Discharge    Condition   Stable    Comment   --               Tony Bravo MD  [address]  [place] [id number] [phone]    Call in 1 day           Medication List        New Prescriptions      HYDROcodone-acetaminophen 5-325 MG per tablet  Commonly known as: NORCO  Take 1 tablet by mouth Every 6 (Six) Hours As Needed for Moderate Pain for up to 3 days.     ketorolac 10 MG tablet  Commonly known as: TORADOL  Take 1 tablet by mouth Every 6 (Six) Hours As Needed for Moderate Pain for up to 5 days.            Stop      ibuprofen 800 MG tablet  Commonly known as: ADVIL,MOTRIN     methylPREDNISolone 4 MG dose pack  Commonly known as: MEDROL               Where to Get Your Medications        These medications were sent to [facility] [address] [place], [place] [address] [place] [address] - [phone]  - [phone] [facility] [address] [address]      Phone: [phone]   HYDROcodone-acetaminophen 5-325 MG per tablet  ketorolac 10 MG tablet            Shelley Schrader PA  01/09/24 1922

## 2024-01-09 NOTE — Clinical Note
[facility] EMERGENCY DEPARTMENT  [address] [facility] [address]  Phone: [phone]    Paras Freed was seen and treated in our emergency department on 1/9/2024.  He may return to work on 01/15/2024.         Thank you for choosing [facility].    Shelley Schrader PA

## 2024-01-10 NOTE — DISCHARGE INSTRUCTIONS
Please wear your sling and utilize pain medication as needed. Please follow up with your ortho or return to ER if symptoms worsen.

## 2024-01-11 ENCOUNTER — TELEPHONE (OUTPATIENT)
Dept: ORTHOPEDIC SURGERY | Facility: CLINIC | Age: 44
End: 2024-01-11

## 2024-01-11 ENCOUNTER — OFFICE VISIT (OUTPATIENT)
Dept: ORTHOPEDIC SURGERY | Facility: CLINIC | Age: 44
End: 2024-01-11
Payer: COMMERCIAL

## 2024-01-11 VITALS — BODY MASS INDEX: 35.55 KG/M2 | WEIGHT: 240 LBS | HEIGHT: 69 IN

## 2024-01-11 DIAGNOSIS — M54.2 CERVICALGIA: Primary | ICD-10-CM

## 2024-01-11 DIAGNOSIS — M75.41 IMPINGEMENT SYNDROME OF RIGHT SHOULDER: ICD-10-CM

## 2024-01-11 DIAGNOSIS — M75.42 IMPINGEMENT SYNDROME OF LEFT SHOULDER: ICD-10-CM

## 2024-01-11 PROCEDURE — 99213 OFFICE O/P EST LOW 20 MIN: CPT | Performed by: PHYSICIAN ASSISTANT

## 2024-01-11 RX ORDER — LAMOTRIGINE 100 MG/1
100 TABLET ORAL DAILY
COMMUNITY
Start: 2024-01-02

## 2024-01-11 RX ORDER — TRAZODONE HYDROCHLORIDE 50 MG/1
50 TABLET ORAL NIGHTLY
COMMUNITY
Start: 2023-12-26

## 2024-01-11 RX ORDER — PROPRANOLOL HYDROCHLORIDE 20 MG/1
20 TABLET ORAL 3 TIMES DAILY PRN
COMMUNITY
Start: 2024-01-02

## 2024-01-11 RX ORDER — VENLAFAXINE HYDROCHLORIDE 150 MG/1
150 CAPSULE, EXTENDED RELEASE ORAL EVERY MORNING
COMMUNITY
Start: 2024-01-02

## 2024-01-11 RX ORDER — OLANZAPINE 2.5 MG/1
2.5 TABLET, FILM COATED ORAL EVERY EVENING
COMMUNITY
Start: 2024-01-02

## 2024-01-11 NOTE — PROGRESS NOTES
[facility] Established Patient Visit          Patient: Paras Freed  YOB: 1980  Date of Encounter: 1/11/2024  PCP: Jessica Rouse APRN      Subjective     Chief Complaint   Patient presents with    Left Shoulder - Initial Evaluation, Pain           History of Present Illness:     Paras Freed is a 43 y.o. male presents today for follow-up evaluation continuation right shoulder impingement with cervicalgia and right-sided cervical radicular component pain and symptoms.  The patient has been struggling with this for approximately 1 year.  He has undergone steroid  subacromial injection with some resolution of his right shoulder pain as well as a Medrol Dosepak which seemed to temporarily alleviate his basicervical region pain and pain into the right upper extremity.  Patient has noticed that the right upper extremity has decreased however he reports a two day history of an acute exacerbation of pain and symptoms into the left upper extremity.  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  He had continuation of pain and symptoms and he was directed to the emergency department in which radiographs of the clavicle and left shoulder were completed revealing no acute findings.  Patient states that following the Toradol injection and medication he has had some improvement however he continues to have anterior shoulder pain radiating past the elbow into the hand.  Patient does not recall a specific injury however just the fixed overhead positioning.       Patient Active Problem List   Diagnosis    Mixed anxiety and depressive disorder     Past Medical History:   Diagnosis Date    Arthritis of back Several years    Back pain     Blood in stool     Rotator cuff syndrome 8months    Tendinitis of  "knee 30 years     Past Surgical History:   Procedure Laterality Date    HERNIA REPAIR      TONSILLECTOMY      VASECTOMY       Social History     Occupational History    Not on file   Tobacco Use    Smoking status: Former     Packs/day: 2.00     Years: 10.00     Additional pack years: 0.00     Total pack years: 20.00     Types: Cigarettes, Cigars     Start date: 2000     Quit date: 2012     Years since quittin.0    Smokeless tobacco: Never    Tobacco comments:     2 packs a day   Vaping Use    Vaping Use: Never used   Substance and Sexual Activity    Alcohol use: Not Currently     Comment: A lot    Drug use: Not Currently     Types: \"Crack\" cocaine, Cocaine(coke), Codeine, Hydrocodone, Marijuana, MDMA (ecstacy), Methamphetamines, Morphine, Oxycodone    Sexual activity: Yes     Partners: Female     Birth control/protection: Vasectomy    Paras Freed  reports that he quit smoking about 12 years ago. His smoking use included cigarettes and cigars. He started smoking about 23 years ago. He has a 20.00 pack-year smoking history. He has never used smokeless tobacco.. I have educated him on the risk of diseases from using tobacco products such as cancer, COPD, and heart disease.           Social History     Social History Narrative    Not on file     Family History   Problem Relation Age of Onset    Other Father     Hypertension Father     Heart disease Father     Cancer Maternal Grandmother     Diabetes Paternal Grandmother     Heart disease Paternal Grandfather     Diabetes Paternal Grandfather     Broken bones Sister         Arm     Current Outpatient Medications   Medication Sig Dispense Refill    cetirizine (zyrTEC) 10 MG tablet Take 1 tablet by mouth Daily.      HYDROcodone-acetaminophen (NORCO) 5-325 MG per tablet Take 1 tablet by mouth Every 6 (Six) Hours As Needed for Moderate Pain for up to 3 days. 12 tablet 0    ketorolac (TORADOL) 10 MG tablet Take 1 tablet by mouth Every 6 (Six) Hours As Needed " "for Moderate Pain for up to 5 days. 20 tablet 0    lamoTRIgine (LaMICtal) 100 MG tablet Take 1 tablet by mouth Daily.      lamoTRIgine (LaMICtal) 200 MG tablet       Loratadine-D 12HR 5-120 MG per 12 hr tablet       OLANZapine (zyPREXA) 2.5 MG tablet Take 1 tablet by mouth Every Evening.      propranolol (INDERAL) 20 MG tablet Take 1 tablet by mouth 3 (Three) Times a Day As Needed (as needed). for anxiety      traZODone (DESYREL) 50 MG tablet Take 1 tablet by mouth Every Night. for sleep.      venlafaxine XR (EFFEXOR-XR) 150 MG 24 hr capsule Take 1 capsule by mouth Every Morning.      buPROPion XL (WELLBUTRIN XL) 150 MG 24 hr tablet       Cholecalciferol 1.25 MG (85518 UT) tablet take 1 capsule only once weekly      citalopram (CeleXA) 40 MG tablet       hydrOXYzine (ATARAX) 25 MG tablet TAKE 1 TABLET BY MOUTH THREE TIMES DAILY AS NEEDED FOR ANXIETY, ITCHING, OR DIFFICULTY SLEEPING       No current facility-administered medications for this visit.     No Known Allergies         Review of Systems   Constitutional: Negative.   HENT: Negative.     Eyes: Negative.    Cardiovascular: Negative.    Respiratory: Negative.     Endocrine: Negative.    Hematologic/Lymphatic: Negative.    Skin: Negative.    Musculoskeletal:         Pertinent positives listed in HPI   Gastrointestinal: Negative.    Genitourinary: Negative.    Neurological:  Positive for numbness.   Psychiatric/Behavioral:  Positive for depression. The patient is nervous/anxious.    Allergic/Immunologic: Negative.          Objective      Vitals:    01/11/24 0959   Weight: 109 kg (240 lb)   Height: 175.3 cm (69\")   PainSc: 0-No pain             Physical Exam  Vitals and nursing note reviewed.   Constitutional:       General: He is not in acute distress.     Appearance: Normal appearance. He is not ill-appearing.   HENT:      Head: Normocephalic and atraumatic.      Right Ear: External ear normal.      Left Ear: External ear normal.      Nose: Nose normal.      " Mouth/Throat:      Mouth: Mucous membranes are moist.      Pharynx: Oropharynx is clear.   Eyes:      Extraocular Movements: Extraocular movements intact.      Conjunctiva/sclera: Conjunctivae normal.      Pupils: Pupils are equal, round, and reactive to light.   Cardiovascular:      Rate and Rhythm: Normal rate.      Pulses: Normal pulses.   Pulmonary:      Effort: Pulmonary effort is normal.   Abdominal:      General: There is no distension.   Musculoskeletal:      Cervical back: Spasms and tenderness present. No rigidity. Pain with movement present. Decreased range of motion.      Comments: Right shoulder on examination today patient exhibits forward painful flexion and abduction greater than 100 degrees.  Patient has Jobes maneuver with speeds test painful strength intact.  Hernandez and Neer's painful.  Auglaize's and Jurgenson's test negative.  Empty can test painful with strength intact.  External rotation at approximately 40 degrees at side with oppositional rotation intact.  Internal rotation at side to the lower lumbar region comparable to contralateral shoulder.  Neurovascular status grossly intact right upper extremity.  Left shoulder today reveals painful forward flexion greater than 90 degrees.  Abduction 100 degrees.  Oppositional external rotation at side painful with strength intact.  Jobes maneuver painful.  Hernandez and Neer's painful.  Cervical spine rotation and extension painful.  Phalen's and Tinel sign at wrist and elbow negative bilaterally.   Skin:     General: Skin is warm and dry.      Capillary Refill: Capillary refill takes less than 2 seconds.   Neurological:      General: No focal deficit present.      Mental Status: He is alert and oriented to person, place, and time.   Psychiatric:         Mood and Affect: Mood normal.         Behavior: Behavior normal.                 Radiology:      Review of MRI from outside source 8/10/2023 reveals there is evidence of superior labral lymphedema  versus SLAP tear.  Subacromial impingement of the supraspinatus tendon with tendinopathy.  Acromioclavicular joint arthropathy.  There is moderate fluid in the biceps tendon sheath with intermediate signal of the tendon Limited evaluation of the superior labrum with diffuse altered signal.      Study Result    Narrative & Impression   MRI CERVICAL SPINE WO CONTRAST-     CLINICAL INDICATION: NECK PAIN; M54.2-Cervicalgia          COMPARISON: None immediately available     TECHNIQUE: Sagittal spin echo scans were obtained from the posterior  fossa through the upper thoracic spine and axial scans were performed  through selected cervical disc space levels, utilizing both spin echo  and gradient echo scans technique without contrast administration.      FINDINGS:   Alignment is anatomic.   The vertebral body heights are adequately maintained.  The signal characteristics of the vertebral bodies are as expected on  the presented pulse sequences.     The disc space heights are preserved.  Tiny central disc bulge at C2-3 which slightly indents the cerebral  spinal fluid space     Slightly broader disc bulge at C6-7 but no significant narrowing at  either level.  No evidence of spinal stenosis or foraminal stenosis.     There is no abnormal cord signal.             IMPRESSION:  Tiny central disc bulge at C2-3 and somewhat broader disc bulge at C6-7          This report was finalized on 8/25/2016 3:50 PM by Dr. Brian Barrow MD.        XR Shoulder 2+ View Left    Result Date: 1/9/2024  Negative study.  This report was finalized on 1/9/2024 6:53 PM by Dr. Allison Gunter MD.      XR Clavicle Left    Result Date: 1/9/2024  No fracture.   This report was finalized on 1/9/2024 6:52 PM by Dr. Allison Gunter MD.         Assessment/Plan        ICD-10-CM ICD-9-CM   1. Cervicalgia  M54.2 723.1   2. Impingement syndrome of right shoulder  M75.41 726.2   3. Impingement syndrome of left shoulder  M75.42 726.2       43-year-old male with  several month history of previous acute traumatic onset right shoulder pain and symptoms consistent with subacromial impingement and tendinopathy.  The patient has seen some improvement with conservative treatment options with continuation of basicervical region neck pain and most recent exacerbation of the left upper extremity.  There is some exam findings and historical findings consistent with left shoulder impingement however given the continued upper extremity numbness and tingling and pain past the elbow into now both upper extremities as well as absence of alleviation with formal outpatient physical therapy as well as medication the patient will need to undergo further diagnostic imaging MRI cervical spine.  Previous MRI from 2016 revealed disc bulging at C6-C7.  Patient will return back upon completion of diagnostic MRI.          This document was signed by Daniel Nowak PA-C January 11, 2024    CC: Jessica Rouse APRN      Dictated Utilizing Dragon Dictation:   Please note that portions of this note were completed with a voice recognition program.   Part of this note may be an electronic transcription/translation of spoken language to printed text using the Dragon Dictation System.

## 2024-01-11 NOTE — TELEPHONE ENCOUNTER
Caller: Paras Freed    Relationship to patient: Self    Best call back number: [phone]    Patient is needing: PATIENT SCHEDULED THE PHYSICAL THERAPY @  PT FOR 01/23/2024 @4 PM

## 2024-01-22 ENCOUNTER — HOSPITAL ENCOUNTER (EMERGENCY)
Facility: HOSPITAL | Age: 44
Discharge: HOME | End: 2024-01-22
Attending: EMERGENCY MEDICINE
Payer: COMMERCIAL

## 2024-01-22 ENCOUNTER — APPOINTMENT (OUTPATIENT)
Dept: GENERAL RADIOLOGY | Facility: HOSPITAL | Age: 44
End: 2024-01-22
Payer: COMMERCIAL

## 2024-01-22 VITALS
TEMPERATURE: 98.6 F | SYSTOLIC BLOOD PRESSURE: 160 MMHG | WEIGHT: 135 LBS | BODY MASS INDEX: 19.99 KG/M2 | OXYGEN SATURATION: 100 % | HEIGHT: 69 IN | RESPIRATION RATE: 22 BRPM | DIASTOLIC BLOOD PRESSURE: 83 MMHG | HEART RATE: 76 BPM

## 2024-01-22 DIAGNOSIS — S69.92XA FINGER INJURY, LEFT, INITIAL ENCOUNTER: Primary | ICD-10-CM

## 2024-01-22 PROCEDURE — 25010000002 CEFAZOLIN PER 500 MG: Performed by: NURSE PRACTITIONER

## 2024-01-22 PROCEDURE — 90715 TDAP VACCINE 7 YRS/> IM: CPT | Performed by: PHYSICIAN ASSISTANT

## 2024-01-22 PROCEDURE — 73130 X-RAY EXAM OF HAND: CPT | Performed by: RADIOLOGY

## 2024-01-22 PROCEDURE — 73130 X-RAY EXAM OF HAND: CPT

## 2024-01-22 PROCEDURE — 90471 IMMUNIZATION ADMIN: CPT | Performed by: PHYSICIAN ASSISTANT

## 2024-01-22 PROCEDURE — 25010000002 TETANUS-DIPHTH-ACELL PERTUSSIS 5-2.5-18.5 LF-MCG/0.5 SUSPENSION PREFILLED SYRINGE: Performed by: PHYSICIAN ASSISTANT

## 2024-01-22 PROCEDURE — 99283 EMERGENCY DEPT VISIT LOW MDM: CPT

## 2024-01-22 PROCEDURE — 96365 THER/PROPH/DIAG IV INF INIT: CPT

## 2024-01-22 RX ORDER — CEPHALEXIN 500 MG/1
500 CAPSULE ORAL 2 TIMES DAILY
Qty: 14 CAPSULE | Refills: 0 | Status: SHIPPED | OUTPATIENT
Start: 2024-01-22 | End: 2024-01-29

## 2024-01-22 RX ORDER — OXYCODONE AND ACETAMINOPHEN 10; 325 MG/1; MG/1
1 TABLET ORAL ONCE
Status: COMPLETED | OUTPATIENT
Start: 2024-01-22 | End: 2024-01-22

## 2024-01-22 RX ORDER — SODIUM CHLORIDE 0.9 % (FLUSH) 0.9 %
10 SYRINGE (ML) INJECTION AS NEEDED
Status: DISCONTINUED | OUTPATIENT
Start: 2024-01-22 | End: 2024-01-23 | Stop reason: HOSPADM

## 2024-01-22 RX ORDER — HYDROCODONE BITARTRATE AND ACETAMINOPHEN 5; 325 MG/1; MG/1
1 TABLET ORAL EVERY 8 HOURS PRN
Qty: 9 TABLET | Refills: 0 | Status: SHIPPED | OUTPATIENT
Start: 2024-01-22 | End: 2024-01-25

## 2024-01-22 RX ADMIN — CEFAZOLIN 1000 MG: 1 INJECTION, POWDER, FOR SOLUTION INTRAMUSCULAR; INTRAVENOUS; PARENTERAL at 21:57

## 2024-01-22 RX ADMIN — OXYCODONE AND ACETAMINOPHEN 1 TABLET: 10; 325 TABLET ORAL at 20:02

## 2024-01-22 RX ADMIN — Medication 3 ML: at 20:41

## 2024-01-22 RX ADMIN — TETANUS TOXOID, REDUCED DIPHTHERIA TOXOID AND ACELLULAR PERTUSSIS VACCINE, ADSORBED 0.5 ML: 5; 2.5; 8; 8; 2.5 SUSPENSION INTRAMUSCULAR at 19:12

## 2024-01-22 NOTE — ED NOTES
MEDICAL SCREENING:    Reason for Visit: fingertip injury     Patient initially seen in triage.  The patient was advised further evaluation and diagnostic testing will be needed, some of the treatment and testing will be initiated in the lobby in order to begin the process.  The patient will be returned to the waiting area for the time being and possibly be re-assessed by a subsequent ED provider.  The patient will be brought back to the treatment area in as timely manner as possible.       Alanna Hernandez PA  01/22/24 1815

## 2024-01-24 ENCOUNTER — OFFICE VISIT (OUTPATIENT)
Dept: SURGERY | Facility: CLINIC | Age: 44
End: 2024-01-24
Payer: COMMERCIAL

## 2024-01-24 VITALS
DIASTOLIC BLOOD PRESSURE: 82 MMHG | WEIGHT: 238.2 LBS | HEIGHT: 69 IN | BODY MASS INDEX: 35.28 KG/M2 | SYSTOLIC BLOOD PRESSURE: 158 MMHG

## 2024-01-24 DIAGNOSIS — S69.92XA FINGER INJURY, LEFT, INITIAL ENCOUNTER: Primary | ICD-10-CM

## 2024-01-24 NOTE — PROGRESS NOTES
"Subjective   Paras Freed is a 43 y.o. male is being seen for consultation today at the request of Jessica Rouse APRN    Paras Freed is a 43 y.o. male History of Present Illness  Who had a carjacked up at work and it fell off the shae and smashed his left third digit and there is distal bruising and swelling of the left third digit.  Plain film showed no evidence of fracture.  This appears to have avulsed the nail is still attached.  Range of motion intact.  No evidence of ischemia.      Past Medical History:   Diagnosis Date    Arthritis of back Several years    Back pain     Blood in stool     Rotator cuff syndrome 8months    Tendinitis of knee 30 years       Family History   Problem Relation Age of Onset    Other Father     Hypertension Father     Heart disease Father     Cancer Maternal Grandmother     Diabetes Paternal Grandmother     Heart disease Paternal Grandfather     Diabetes Paternal Grandfather     Broken bones Sister         Arm       Social History     Socioeconomic History    Marital status:    Tobacco Use    Smoking status: Former     Packs/day: 2.00     Years: 10.00     Additional pack years: 0.00     Total pack years: 20.00     Types: Cigarettes, Cigars     Start date: 2000     Quit date: 2012     Years since quittin.0    Smokeless tobacco: Never    Tobacco comments:     2 packs a day   Vaping Use    Vaping Use: Never used   Substance and Sexual Activity    Alcohol use: Not Currently     Comment: A lot    Drug use: Not Currently     Types: \"Crack\" cocaine, Cocaine(coke), Codeine, Hydrocodone, Marijuana, MDMA (ecstacy), Methamphetamines, Morphine, Oxycodone    Sexual activity: Yes     Partners: Female     Birth control/protection: Vasectomy       Past Surgical History:   Procedure Laterality Date    HERNIA REPAIR      TONSILLECTOMY      VASECTOMY         Review of Systems   Constitutional:  Negative for activity change, appetite change, chills and fever.   HENT:  " "Negative for sore throat and trouble swallowing.    Eyes:  Negative for visual disturbance.   Respiratory:  Negative for cough and shortness of breath.    Cardiovascular:  Negative for chest pain and palpitations.   Gastrointestinal:  Negative for abdominal distention, abdominal pain, blood in stool, constipation, diarrhea, nausea and vomiting.   Endocrine: Negative for cold intolerance and heat intolerance.   Genitourinary:  Negative for dysuria.   Musculoskeletal:  Negative for joint swelling.   Skin:  Negative for color change, rash and wound.   Allergic/Immunologic: Negative for immunocompromised state.   Neurological:  Negative for dizziness, seizures, weakness and headaches.   Hematological:  Negative for adenopathy. Does not bruise/bleed easily.   Psychiatric/Behavioral:  Negative for agitation and confusion.          /82   Ht 175.3 cm (69\")   Wt 108 kg (238 lb 3.2 oz)   BMI 35.18 kg/m²   Objective   Physical Exam  Constitutional:       Appearance: He is well-developed.   HENT:      Head: Normocephalic and atraumatic.   Eyes:      Conjunctiva/sclera: Conjunctivae normal.      Pupils: Pupils are equal, round, and reactive to light.   Neck:      Thyroid: No thyromegaly.      Vascular: No JVD.      Trachea: No tracheal deviation.   Cardiovascular:      Rate and Rhythm: Normal rate and regular rhythm.      Heart sounds: No murmur heard.     No friction rub. No gallop.   Pulmonary:      Effort: Pulmonary effort is normal.      Breath sounds: Normal breath sounds.   Abdominal:      General: There is no distension.      Palpations: Abdomen is soft. There is no hepatomegaly or splenomegaly.      Tenderness: There is no abdominal tenderness.      Hernia: No hernia is present.   Musculoskeletal:         General: No deformity. Normal range of motion.      Left hand: Laceration present.      Cervical back: Neck supple.      Comments: Nailbed avulsion left third digit.   Skin:     General: Skin is warm and dry. "   Neurological:      Mental Status: He is alert and oriented to person, place, and time.       Left third digit fingernail removal    The left third digit was prepped and draped in usual sterile fashion.  Digital block was performed with plain lidocaine.  Using a hemostat the nail bed was elevated from the underlying tuft and removed.  The nailbed had been detached at its base from the initial injury.  Upon removal of the nail the underlying tuft and nailbed was also avulsed but there was no grossly exposed bone.  The avulsed area was tethered distally with adequate blood flow and this was placed back down on the wound bed after irrigation  An nonadherent bandage and gauze wrap were placed and the patient will be referred to orthopedic surgery for further management.      Assessment   Diagnoses and all orders for this visit:    1. Finger injury, left, initial encounter (Primary)      Paras Freed is a 43 y.o. male with nailbed avulsion following crush injury of the left third digit of the hand.  The patient had the nail removed in the office today and there is underlying avulsion of the nailbed tuft but no evidence of exposed bone or underlying fracture on imaging or examination.  He will keep a nonadherent gauze and Curlex wrap on the wound and follow-up with orthopedic surgery soon as possible.

## 2024-01-29 ENCOUNTER — OFFICE VISIT (OUTPATIENT)
Dept: ORTHOPEDIC SURGERY | Facility: CLINIC | Age: 44
End: 2024-01-29
Payer: COMMERCIAL

## 2024-01-29 VITALS — HEIGHT: 69 IN | BODY MASS INDEX: 36.29 KG/M2 | WEIGHT: 245 LBS

## 2024-01-29 DIAGNOSIS — M75.41 IMPINGEMENT SYNDROME OF RIGHT SHOULDER: ICD-10-CM

## 2024-01-29 DIAGNOSIS — M25.511 RIGHT SHOULDER PAIN, UNSPECIFIED CHRONICITY: ICD-10-CM

## 2024-01-29 DIAGNOSIS — S69.92XA INJURY OF NAIL BED OF FINGER OF LEFT HAND, INITIAL ENCOUNTER: ICD-10-CM

## 2024-01-29 DIAGNOSIS — M54.2 CERVICALGIA: Primary | ICD-10-CM

## 2024-01-29 DIAGNOSIS — M75.42 IMPINGEMENT SYNDROME OF LEFT SHOULDER: ICD-10-CM

## 2024-01-29 PROCEDURE — 99213 OFFICE O/P EST LOW 20 MIN: CPT | Performed by: PHYSICIAN ASSISTANT

## 2024-02-01 NOTE — ED PROVIDER NOTES
Subjective   History of Present Illness  Patient is a 43-year-old male with significant past medical history positive for chronic back pain presenting to the ER complaints of left finger injury. Patient states that he was working a car fell on his left middle finger.  Patient denies any additional symptoms at this time or injuries.  Patient is not up-to-date on Tdap.    History provided by:  Patient   used: No        Review of Systems   Constitutional: Negative.  Negative for fever.   HENT: Negative.     Respiratory: Negative.     Cardiovascular: Negative.  Negative for chest pain.   Gastrointestinal: Negative.  Negative for abdominal pain.   Endocrine: Negative.    Genitourinary: Negative.  Negative for dysuria.   Skin:  Positive for wound.   Neurological: Negative.    Psychiatric/Behavioral: Negative.     All other systems reviewed and are negative.      Past Medical History:   Diagnosis Date    Arthritis of back Several years    Back pain     Blood in stool     Rotator cuff syndrome 8months    Tendinitis of knee 30 years       No Known Allergies    Past Surgical History:   Procedure Laterality Date    HERNIA REPAIR      TONSILLECTOMY      VASECTOMY         Family History   Problem Relation Age of Onset    Other Father     Hypertension Father     Heart disease Father     Cancer Maternal Grandmother     Diabetes Paternal Grandmother     Heart disease Paternal Grandfather     Diabetes Paternal Grandfather     Broken bones Sister         Arm       Social History     Socioeconomic History    Marital status:    Tobacco Use    Smoking status: Former     Packs/day: 2.00     Years: 10.00     Additional pack years: 0.00     Total pack years: 20.00     Types: Cigarettes, Cigars     Start date: 2000     Quit date: 2012     Years since quittin.0    Smokeless tobacco: Never    Tobacco comments:     2 packs a day   Vaping Use    Vaping Use: Never used   Substance and Sexual Activity     "Alcohol use: Not Currently     Comment: A lot    Drug use: Not Currently     Types: \"Crack\" cocaine, Cocaine(coke), Codeine, Hydrocodone, Marijuana, MDMA (ecstacy), Methamphetamines, Morphine, Oxycodone    Sexual activity: Yes     Partners: Female     Birth control/protection: Vasectomy           Objective   Physical Exam  Vitals and nursing note reviewed.   Constitutional:       General: He is not in acute distress.     Appearance: He is well-developed. He is not diaphoretic.   HENT:      Head: Normocephalic and atraumatic.      Right Ear: External ear normal.      Left Ear: External ear normal.      Nose: Nose normal.   Eyes:      Conjunctiva/sclera: Conjunctivae normal.      Pupils: Pupils are equal, round, and reactive to light.   Neck:      Vascular: No JVD.      Trachea: No tracheal deviation.   Cardiovascular:      Rate and Rhythm: Normal rate and regular rhythm.      Heart sounds: Normal heart sounds. No murmur heard.  Pulmonary:      Effort: Pulmonary effort is normal. No respiratory distress.      Breath sounds: Normal breath sounds. No wheezing.   Abdominal:      General: Bowel sounds are normal.      Palpations: Abdomen is soft.      Tenderness: There is no abdominal tenderness.   Musculoskeletal:         General: Tenderness and signs of injury present. No deformity. Normal range of motion.      Cervical back: Normal range of motion and neck supple.   Skin:     General: Skin is warm and dry.      Coloration: Skin is not pale.      Findings: No erythema or rash.   Neurological:      Mental Status: He is alert and oriented to person, place, and time.      Cranial Nerves: No cranial nerve deficit.   Psychiatric:         Behavior: Behavior normal.         Thought Content: Thought content normal.         Procedures       XR Hand 3+ View Left   Final Result   1.  No displaced fracture or dislocation.   2.  Linear lucency tuft of distal phalanx of the fifth digit represents   age-indeterminate fracture. Seen " only on lateral view.           This report was finalized on 1/22/2024 6:41 PM by Dr. Kai Beth MD.               ED Course                                             Medical Decision Making  Patient is a 43-year-old male with significant past medical history positive for chronic back pain presenting to the ER complaints of left finger injury. Patient states that he was working a car fell on his left middle finger.  Patient denies any additional symptoms at this time or injuries.  Patient is not up-to-date on Tdap.    Advised patient to return to the ER with new or worsening symptoms.  Advised patient to follow-up with PCP.  Patient verbalized understanding and agrees.  Vital signs are stable at discharge.  Patient is in no acute distress.    Problems Addressed:  Finger injury, left, initial encounter: complicated acute illness or injury    Amount and/or Complexity of Data Reviewed  Radiology: ordered.    Risk  Prescription drug management.        Final diagnoses:   Finger injury, left, initial encounter       ED Disposition  ED Disposition       ED Disposition   Discharge    Condition   Stable    Comment   --               Jessica Rouse APRN  [address] [facility] [address]  [phone]    Schedule an appointment as soon as possible for a visit   For wound re-check         Medication List        ASK your doctor about these medications      cephalexin 500 MG capsule  Commonly known as: KEFLEX  Take 1 capsule by mouth 2 (Two) Times a Day for 7 days.  Ask about: Should I take this medication?     HYDROcodone-acetaminophen 5-325 MG per tablet  Commonly known as: NORCO  Take 1 tablet by mouth Every 8 (Eight) Hours As Needed for Severe Pain for up to 3 days.  Ask about: Should I take this medication?               Where to Get Your Medications        These medications were sent to [facility] [address] [place] [address] - [address] [facility] [address] - [phone]  - [phone]  [address]  [address]      Phone: [phone]   cephalexin 500 MG capsule  HYDROcodone-acetaminophen 5-325 MG per tablet            Jessica Williamson, APRN  01/31/24 6653

## 2024-02-02 ENCOUNTER — HOSPITAL ENCOUNTER (OUTPATIENT)
Dept: MRI IMAGING | Facility: HOSPITAL | Age: 44
Discharge: HOME | End: 2024-02-02
Admitting: PHYSICIAN ASSISTANT
Payer: COMMERCIAL

## 2024-02-02 DIAGNOSIS — M54.2 CERVICALGIA: ICD-10-CM

## 2024-02-02 PROCEDURE — 72141 MRI NECK SPINE W/O DYE: CPT | Performed by: RADIOLOGY

## 2024-02-02 PROCEDURE — 72141 MRI NECK SPINE W/O DYE: CPT

## 2024-02-05 ENCOUNTER — OFFICE VISIT (OUTPATIENT)
Dept: ORTHOPEDIC SURGERY | Facility: CLINIC | Age: 44
End: 2024-02-05
Payer: COMMERCIAL

## 2024-02-05 VITALS — BODY MASS INDEX: 35.7 KG/M2 | WEIGHT: 241 LBS | HEIGHT: 69 IN

## 2024-02-05 DIAGNOSIS — S69.92XD INJURY OF NAIL BED OF FINGER OF LEFT HAND, SUBSEQUENT ENCOUNTER: ICD-10-CM

## 2024-02-05 DIAGNOSIS — M54.2 CERVICALGIA: Primary | ICD-10-CM

## 2024-02-05 DIAGNOSIS — M75.41 IMPINGEMENT SYNDROME OF RIGHT SHOULDER: ICD-10-CM

## 2024-02-05 PROCEDURE — 99213 OFFICE O/P EST LOW 20 MIN: CPT | Performed by: PHYSICIAN ASSISTANT

## 2024-02-20 ENCOUNTER — TELEPHONE (OUTPATIENT)
Dept: ORTHOPEDIC SURGERY | Facility: CLINIC | Age: 44
End: 2024-02-20
Payer: COMMERCIAL

## 2024-02-20 NOTE — TELEPHONE ENCOUNTER
Caller: Paras Freed    Relationship: Self    Best call back number: [phone]    What orders are you requesting (i.e. lab or imaging): PHYSICAL THERAPY  IN [place]     In what timeframe would the patient need to come in: ASAP     Where will you receive your lab/imaging services:  IN [place] F#[phone]    Additional notes: WOULD LIKE A CALL WHEN THE ORDER HAS BEEN PLACED

## 2024-02-20 NOTE — PROGRESS NOTES
[facility] [place] [facility] Established Patient Visit          Patient: Paras Freed  YOB: 1980  Date of Encounter: 2024  PCP: Jessica Rouse APRN      Subjective     Chief Complaint   Patient presents with    Left Hand - Follow-up           History of Present Illness:     Paras Freed is a 43 y.o. male presents today for follow-up evaluation continuation right shoulder impingement with cervicalgia and right-sided cervical radicular component pain and symptoms.  The patient has been struggling with this for approximately 1 year.  He has undergone steroid  subacromial injection with some resolution of his right shoulder pain as well as a Medrol Dosepak which seemed to temporarily alleviate his basicervical region pain and pain into the right upper extremity.  He presents today for MRI results review as well as continuation of evaluation of the previous nailbed injury to which she had nail removed via Dr. Nas Mohan.  He is doing well with this without any significant complication.      Patient Active Problem List   Diagnosis    Mixed anxiety and depressive disorder    Finger injury, left, initial encounter     Past Medical History:   Diagnosis Date    Arthritis of back Several years    Back pain     Blood in stool     Rotator cuff syndrome 8months    Tendinitis of knee 30 years     Past Surgical History:   Procedure Laterality Date    HERNIA REPAIR      TONSILLECTOMY      VASECTOMY       Social History     Occupational History    Not on file   Tobacco Use    Smoking status: Former     Packs/day: 2.00     Years: 10.00     Additional pack years: 0.00     Total pack years: 20.00     Types: Cigarettes, Cigars     Start date: 2000     Quit date: 2012     Years since quittin.1    Smokeless tobacco: Never    Tobacco comments:     2 packs a day   Vaping Use    Vaping Use: Never used   Substance and Sexual Activity    Alcohol use: Not Currently     Comment: A lot    Drug use: Not  "Currently     Types: \"Crack\" cocaine, Cocaine(coke), Codeine, Hydrocodone, Marijuana, MDMA (ecstacy), Methamphetamines, Morphine, Oxycodone    Sexual activity: Yes     Partners: Female     Birth control/protection: Vasectomy    Paras Freed  reports that he quit smoking about 12 years ago. His smoking use included cigarettes and cigars. He started smoking about 23 years ago. He has a 20.00 pack-year smoking history. He has never used smokeless tobacco.. I have educated him on the risk of diseases from using tobacco products such as cancer, COPD, and heart disease.           Social History     Social History Narrative    Not on file     Family History   Problem Relation Age of Onset    Other Father     Hypertension Father     Heart disease Father     Cancer Maternal Grandmother     Diabetes Paternal Grandmother     Heart disease Paternal Grandfather     Diabetes Paternal Grandfather     Broken bones Sister         Arm     Current Outpatient Medications   Medication Sig Dispense Refill    buPROPion XL (WELLBUTRIN XL) 150 MG 24 hr tablet       cetirizine (zyrTEC) 10 MG tablet Take 1 tablet by mouth Daily.      Cholecalciferol 1.25 MG (40399 UT) tablet take 1 capsule only once weekly      citalopram (CeleXA) 40 MG tablet       hydrOXYzine (ATARAX) 25 MG tablet TAKE 1 TABLET BY MOUTH THREE TIMES DAILY AS NEEDED FOR ANXIETY, ITCHING, OR DIFFICULTY SLEEPING      lamoTRIgine (LaMICtal) 100 MG tablet Take 1 tablet by mouth Daily.      lamoTRIgine (LaMICtal) 200 MG tablet       Loratadine-D 12HR 5-120 MG per 12 hr tablet       OLANZapine (zyPREXA) 2.5 MG tablet Take 1 tablet by mouth Every Evening.      propranolol (INDERAL) 20 MG tablet Take 1 tablet by mouth 3 (Three) Times a Day As Needed (as needed). for anxiety      traZODone (DESYREL) 50 MG tablet Take 1 tablet by mouth Every Night. for sleep.      venlafaxine XR (EFFEXOR-XR) 150 MG 24 hr capsule Take 1 capsule by mouth Every Morning.       No current " "facility-administered medications for this visit.     No Known Allergies         Review of Systems   Constitutional: Negative.   HENT: Negative.     Eyes: Negative.    Cardiovascular: Negative.    Respiratory: Negative.     Endocrine: Negative.    Hematologic/Lymphatic: Negative.    Skin: Negative.    Musculoskeletal:         Pertinent positives listed in HPI   Gastrointestinal: Negative.    Genitourinary: Negative.    Neurological:  Positive for numbness.   Psychiatric/Behavioral:  Positive for depression. The patient is nervous/anxious.    Allergic/Immunologic: Negative.          Objective      Vitals:    02/05/24 1337   Weight: 109 kg (241 lb)   Height: 175.3 cm (69\")   PainSc: 0-No pain               Physical Exam  Vitals and nursing note reviewed.   Constitutional:       General: He is not in acute distress.     Appearance: Normal appearance. He is not ill-appearing.   HENT:      Head: Normocephalic and atraumatic.      Right Ear: External ear normal.      Left Ear: External ear normal.      Nose: Nose normal.      Mouth/Throat:      Mouth: Mucous membranes are moist.      Pharynx: Oropharynx is clear.   Eyes:      Extraocular Movements: Extraocular movements intact.      Conjunctiva/sclera: Conjunctivae normal.      Pupils: Pupils are equal, round, and reactive to light.   Cardiovascular:      Rate and Rhythm: Normal rate.      Pulses: Normal pulses.   Pulmonary:      Effort: Pulmonary effort is normal.   Abdominal:      General: There is no distension.   Musculoskeletal:      Left hand: Swelling present. No lacerations or tenderness. Normal range of motion. Normal strength. Normal sensation. Normal capillary refill. Normal pulse.        Arms:       Cervical back: Spasms and tenderness present. No rigidity. Pain with movement present. Decreased range of motion.      Comments: Right shoulder on examination today patient exhibits forward painful flexion and abduction greater than 100 degrees.  Patient has Jobes " maneuver with speeds test painful strength intact.  Hernandez and Neer's painful.  Floyd's and Jurgenson's test negative.  Empty can test painful with strength intact.  External rotation at approximately 40 degrees at side with oppositional rotation intact.  Internal rotation at side to the lower lumbar region comparable to contralateral shoulder.  Neurovascular status grossly intact right upper extremity.  Left shoulder today reveals painful forward flexion greater than 90 degrees.  Abduction 100 degrees.  Oppositional external rotation at side painful with strength intact.  Jobes maneuver painful.  Hernandez and Neer's painful.  Cervical spine rotation and extension painful.  Phalen's and Tinel sign at wrist and elbow negative bilaterally.    Examination today of patient's left hand third digit reveals removal of nail with present ecchymotic nail matrix.  No skin laceration or skin opening.  Full range of motion neurovascular status grossly intact,    Skin:     General: Skin is warm and dry.      Capillary Refill: Capillary refill takes less than 2 seconds.   Neurological:      General: No focal deficit present.      Mental Status: He is alert and oriented to person, place, and time.   Psychiatric:         Mood and Affect: Mood normal.         Behavior: Behavior normal.                 Radiology:      Review of MRI from outside source 8/10/2023 reveals there is evidence of superior labral lymphedema versus SLAP tear.  Subacromial impingement of the supraspinatus tendon with tendinopathy.  Acromioclavicular joint arthropathy.  There is moderate fluid in the biceps tendon sheath with intermediate signal of the tendon Limited evaluation of the superior labrum with diffuse altered signal.      Study Result    Narrative & Impression   MRI CERVICAL SPINE WO CONTRAST-     CLINICAL INDICATION: NECK PAIN; M54.2-Cervicalgia          COMPARISON: None immediately available     TECHNIQUE: Sagittal spin echo scans were obtained  from the posterior  fossa through the upper thoracic spine and axial scans were performed  through selected cervical disc space levels, utilizing both spin echo  and gradient echo scans technique without contrast administration.      FINDINGS:   Alignment is anatomic.   The vertebral body heights are adequately maintained.  The signal characteristics of the vertebral bodies are as expected on  the presented pulse sequences.     The disc space heights are preserved.  Tiny central disc bulge at C2-3 which slightly indents the cerebral  spinal fluid space     Slightly broader disc bulge at C6-7 but no significant narrowing at  either level.  No evidence of spinal stenosis or foraminal stenosis.     There is no abnormal cord signal.             IMPRESSION:  Tiny central disc bulge at C2-3 and somewhat broader disc bulge at C6-7          This report was finalized on 8/25/2016 3:50 PM by Dr. Brian Barrow MD.        MRI Cervical Spine Without Contrast    Result Date: 2/2/2024  Annular disc bulge at C6-7 with mild central and foraminal narrowing    This report was finalized on 2/2/2024 1:24 PM by Dr. Brian Barrow MD.      XR Hand 3+ View Left    Result Date: 1/22/2024  1.  No displaced fracture or dislocation. 2.  Linear lucency tuft of distal phalanx of the fifth digit represents age-indeterminate fracture. Seen only on lateral view.   This report was finalized on 1/22/2024 6:41 PM by Dr. Kai Beth MD.         Assessment/Plan        ICD-10-CM ICD-9-CM   1. Cervicalgia  M54.2 723.1   2. Impingement syndrome of right shoulder  M75.41 726.2   3. Injury of nail bed of finger of left hand, subsequent encounter  S69.92XD V58.89     959.5       43-year-old male with several month history of previous acute traumatic right shoulder pain and cervicalgia with subacromial impingement and tendinopathy as well as cervical disc bulging at C6-7.  Patient has had resolution of pain and symptoms with no current findings with his  cervicalgia.  Patient continues with dry dressing changes as well as evaluation of slow growth of the nail following removal from previous injury.  He will continue to monitor for any significant complications.  The patient was instructed to return back in 3 weeks for further evaluation.  No direct surgical indication            This document was signed by Daniel Nowak PA-C February 5 , 2024    CC: Jessica Rouse APRN      Dictated Utilizing Dragon Dictation:   Please note that portions of this note were completed with a voice recognition program.   Part of this note may be an electronic transcription/translation of spoken language to printed text using the Dragon Dictation System.

## 2024-02-22 DIAGNOSIS — M75.41 IMPINGEMENT SYNDROME OF RIGHT SHOULDER: ICD-10-CM

## 2024-02-22 DIAGNOSIS — M54.2 CERVICALGIA: Primary | ICD-10-CM

## 2024-02-22 DIAGNOSIS — M75.42 IMPINGEMENT SYNDROME OF LEFT SHOULDER: ICD-10-CM

## 2024-02-22 DIAGNOSIS — M25.511 RIGHT SHOULDER PAIN, UNSPECIFIED CHRONICITY: ICD-10-CM

## 2025-04-24 ENCOUNTER — OFFICE VISIT (OUTPATIENT)
Dept: FAMILY MEDICINE CLINIC | Facility: CLINIC | Age: 45
End: 2025-04-24
Payer: COMMERCIAL

## 2025-04-24 VITALS
SYSTOLIC BLOOD PRESSURE: 118 MMHG | WEIGHT: 226.6 LBS | TEMPERATURE: 98.8 F | OXYGEN SATURATION: 96 % | HEIGHT: 69 IN | DIASTOLIC BLOOD PRESSURE: 80 MMHG | BODY MASS INDEX: 33.56 KG/M2 | HEART RATE: 73 BPM

## 2025-04-24 DIAGNOSIS — R53.82 CHRONIC FATIGUE: ICD-10-CM

## 2025-04-24 DIAGNOSIS — F33.1 MAJOR DEPRESSIVE DISORDER, RECURRENT, MODERATE: Chronic | ICD-10-CM

## 2025-04-24 DIAGNOSIS — M25.541 ARTHRALGIA OF RIGHT HAND: Primary | ICD-10-CM

## 2025-04-24 PROCEDURE — 85027 COMPLETE CBC AUTOMATED: CPT | Performed by: INTERNAL MEDICINE

## 2025-04-24 PROCEDURE — 85652 RBC SED RATE AUTOMATED: CPT | Performed by: INTERNAL MEDICINE

## 2025-04-24 PROCEDURE — 86038 ANTINUCLEAR ANTIBODIES: CPT | Performed by: INTERNAL MEDICINE

## 2025-04-24 PROCEDURE — 84550 ASSAY OF BLOOD/URIC ACID: CPT | Performed by: INTERNAL MEDICINE

## 2025-04-24 PROCEDURE — 80053 COMPREHEN METABOLIC PANEL: CPT | Performed by: INTERNAL MEDICINE

## 2025-04-24 PROCEDURE — 86431 RHEUMATOID FACTOR QUANT: CPT | Performed by: INTERNAL MEDICINE

## 2025-04-24 PROCEDURE — 86140 C-REACTIVE PROTEIN: CPT | Performed by: INTERNAL MEDICINE

## 2025-04-24 PROCEDURE — 86200 CCP ANTIBODY: CPT | Performed by: INTERNAL MEDICINE

## 2025-04-24 PROCEDURE — 84403 ASSAY OF TOTAL TESTOSTERONE: CPT | Performed by: INTERNAL MEDICINE

## 2025-04-24 RX ORDER — ERGOCALCIFEROL (VITAMIN D2) 10 MCG
400 TABLET ORAL DAILY
COMMUNITY

## 2025-04-24 NOTE — PROGRESS NOTES
Venipuncture Blood Specimen Collection  Venipuncture performed in right antecubital by Heather Symes, MA with good hemostasis. Patient tolerated the procedure well without complications.   04/24/25   Heather Symes, MA

## 2025-04-24 NOTE — PROGRESS NOTES
Patient Name: Paras Freed Today's Date: 2025   Patient MRN / CSN: [id number] / [id number] Date of Encounter: 2025   Patient Age / : 44 y.o. / 1980 Encounter Provider: Ellen Magana DO   Referring Physician: No ref. provider found          Paras is a 44 y.o. male who is being seen today for Establish Care (Here to establish care. Doing okay today. Complaints of right hand swelling for a while. )      History of Present Illness    Paras presents today to establish care with a medical history including MDD with anxiety, stable on the current medicine regimen including Effexor and Lamictal.  He has concerns today of persistent right hand swelling and pain.  He has noted pain in his third MCP joint for months, sometimes with redness and swelling.  He recalls an injury to his right fourth digit a few weeks ago, but notes that the pain in his hand has been going on long before it.  He has tried ibuprofen without relief.  He is unaware of any autoimmune diseases such as lupus or rheumatoid arthritis in his family.  He does note pain in his left foot with a firm nodule noted on top of his foot.  He has had this going on for several months as well without injury.  Over the past couple of months, he has been eating very healthy but is continuing to have pain.    Allergies include:Patient has no known allergies.  Current Outpatient Medications   Medication Sig Dispense Refill    cetirizine (zyrTEC) 10 MG tablet Take 1 tablet by mouth Daily.      lamoTRIgine (LaMICtal) 100 MG tablet Take 1 tablet by mouth Daily. (Patient taking differently: Take 2 tablets by mouth Daily.)      venlafaxine XR (EFFEXOR-XR) 150 MG 24 hr capsule Take 1 capsule by mouth Every Morning.      Vitamin D, Cholecalciferol, (CHOLECALCIFEROL) 10 MCG (400 UNIT) tablet Take 1 tablet by mouth Daily.       No current facility-administered medications for this visit.     Past Medical History:   Diagnosis Date    Arthritis of back  "Several years    Back pain     Blood in stool     Rotator cuff syndrome 8months    Tendinitis of knee 30 years     Family History   Problem Relation Age of Onset    Other Father     Hypertension Father     Heart disease Father     Cancer Maternal Grandmother     Diabetes Paternal Grandmother     Heart disease Paternal Grandfather     Diabetes Paternal Grandfather     Broken bones Sister         Arm     Past Surgical History:   Procedure Laterality Date    HERNIA REPAIR      TONSILLECTOMY      VASECTOMY       Social History     Substance and Sexual Activity   Alcohol Use Not Currently     Social History     Tobacco Use   Smoking Status Former    Current packs/day: 0.00    Average packs/day: 2.0 packs/day for 11.0 years (22.1 ttl pk-yrs)    Types: Cigarettes, Cigars    Start date: 2000    Quit date: 2012    Years since quittin.3   Smokeless Tobacco Never   Tobacco Comments    2 packs a day     Social History     Substance and Sexual Activity   Drug Use Not Currently    Types: \"Crack\" cocaine, Cocaine(coke), Codeine, Hydrocodone, Marijuana, MDMA (ecstacy), Methamphetamines, Morphine, Oxycodone     Review of Systems   Constitutional:  Positive for fatigue. Negative for fever.   Respiratory:  Negative for shortness of breath.    Cardiovascular:  Negative for chest pain.   Gastrointestinal:  Negative for blood in stool.   Genitourinary:  Negative for hematuria.   Musculoskeletal:  Positive for arthralgias.   Skin:  Negative for rash.   Psychiatric/Behavioral:  Positive for sleep disturbance. Negative for suicidal ideas.         Mild sleep apnea years ago, did not tolerate mask. He reports aggressive weight loss has helped.         Depression Assessment Review:  PHQ-9 Total Score:    Vital Signs & Measurements Taken This Encounter  /80 (BP Location: Left arm, Patient Position: Sitting, Cuff Size: Large Adult)   Pulse 73   Temp 98.8 °F (37.1 °C) (Oral)   Ht 175.3 cm (69\")   Wt 103 kg (226 lb 9.6 oz) "   SpO2 96%   BMI 33.46 kg/m²    SpO2 Percentage    04/24/25 1541   SpO2: 96%        BMI is >= 30 and <35. (Class 1 Obesity). The following options were offered after discussion;: exercise counseling/recommendations-I encouraged him to continue healthy habits.       Physical Exam  Vitals reviewed.   Constitutional:       General: He is not in acute distress.  HENT:      Head: Normocephalic and atraumatic.   Eyes:      General: No scleral icterus.     Extraocular Movements: Extraocular movements intact.      Conjunctiva/sclera: Conjunctivae normal.      Pupils: Pupils are equal, round, and reactive to light.   Cardiovascular:      Rate and Rhythm: Normal rate and regular rhythm.   Pulmonary:      Effort: Pulmonary effort is normal. No respiratory distress.      Breath sounds: Normal breath sounds. No wheezing or rhonchi.   Abdominal:      Palpations: Abdomen is soft.      Tenderness: There is no abdominal tenderness. There is no guarding or rebound.   Musculoskeletal:      Cervical back: Neck supple. No tenderness.      Comments: The right third MCP joint is erythematous, with soft tissue swelling.  He is able to move all fingers without difficulty.  He has a firm nodule without erythema or warmth on the top of his left foot.   Lymphadenopathy:      Cervical: No cervical adenopathy.   Skin:     General: Skin is warm and dry.      Coloration: Skin is not jaundiced.   Neurological:      Mental Status: He is alert.   Psychiatric:         Mood and Affect: Mood normal.         Behavior: Behavior normal.         Thought Content: Thought content normal.         Judgment: Judgment normal.              Assessment & Plan  Patient Active Problem List   Diagnosis    Mixed anxiety and depressive disorder    Finger injury, left, initial encounter    Arthralgia of right hand    Chronic fatigue    Major depressive disorder, recurrent, moderate       ICD-10-CM ICD-9-CM   1. Arthralgia of right hand  M25.541 719.44   2. Chronic  fatigue  R53.82 780.79   3. Major depressive disorder, recurrent, moderate  F33.1 296.32     Diagnoses and all orders for this visit:    1. Arthralgia of right hand (Primary)  -     CBC (No Diff)  -     Comprehensive Metabolic Panel  -     Testosterone  -     SAVANA With / DsDNA, RNP, Sjogrens A / B, Smith  -     C-reactive Protein  -     Sedimentation Rate  -     Rheumatoid Factor  -     Cyclic Citrul Peptide Antibody, IgG / IgA  -     Uric Acid    2. Chronic fatigue  -     CBC (No Diff)  -     Comprehensive Metabolic Panel  -     Testosterone  -     SAVANA With / DsDNA, RNP, Sjogrens A / B, Smith  -     C-reactive Protein  -     Sedimentation Rate  -     Rheumatoid Factor  -     Cyclic Citrul Peptide Antibody, IgG / IgA  -     Uric Acid    3. Major depressive disorder, recurrent, moderate  Comments:  Managed by Yamini Contreras, and therapist Cammy Florence.         Meds Ordered During Visit:  No orders of the defined types were placed in this encounter.      Paras had labs done recently at [facility] and primary care clinic.  I reviewed those labs through care everywhere today.  At this time, I recommend autoimmune testing as above.  We will also check a testosterone level, as he has been told recently that he has low testosterone.  I encouraged him to continue his psychiatry follow-ups as planned.  I offered x-rays of the hands and feet today but we agreed to hold on lab findings first.  I encouraged him to continue healthy eating habits, and discussed the Mediterranean diet.    Return in about 3 months (around 7/24/2025), or if symptoms worsen or fail to improve, for Please obtain colonoscopy report from [facility].          Referring Provider (if known): No ref. provider found      This document has been electronically signed by Ellen Magana DO  April 24, 2025 18:03 EDT    Ellen Magana DO, FACOI  [address] [place] [address]  [phone] (office)    Part of this note may be an  electronic transcription/translation of spoken language to printed text using the Dragon Dictation System.

## 2025-04-25 LAB
ALBUMIN SERPL-MCNC: 4.6 G/DL (ref 3.5–5.2)
ALBUMIN/GLOB SERPL: 1.8 G/DL
ALP SERPL-CCNC: 80 U/L (ref 39–117)
ALT SERPL W P-5'-P-CCNC: 22 U/L (ref 1–41)
ANION GAP SERPL CALCULATED.3IONS-SCNC: 9.3 MMOL/L (ref 5–15)
AST SERPL-CCNC: 24 U/L (ref 1–40)
BILIRUB SERPL-MCNC: 0.5 MG/DL (ref 0–1.2)
BUN SERPL-MCNC: 14 MG/DL (ref 6–20)
BUN/CREAT SERPL: 11 (ref 7–25)
CALCIUM SPEC-SCNC: 9.8 MG/DL (ref 8.6–10.5)
CHLORIDE SERPL-SCNC: 106 MMOL/L (ref 98–107)
CHROMATIN AB SERPL-ACNC: <10 IU/ML (ref 0–14)
CO2 SERPL-SCNC: 24.7 MMOL/L (ref 22–29)
CREAT SERPL-MCNC: 1.27 MG/DL (ref 0.76–1.27)
CRP SERPL-MCNC: <0.3 MG/DL (ref 0–0.5)
DEPRECATED RDW RBC AUTO: 46 FL (ref 37–54)
EGFRCR SERPLBLD CKD-EPI 2021: 71.9 ML/MIN/1.73
ERYTHROCYTE [DISTWIDTH] IN BLOOD BY AUTOMATED COUNT: 14 % (ref 12.3–15.4)
ERYTHROCYTE [SEDIMENTATION RATE] IN BLOOD: 5 MM/HR (ref 0–15)
GLOBULIN UR ELPH-MCNC: 2.6 GM/DL
GLUCOSE SERPL-MCNC: 76 MG/DL (ref 65–99)
HCT VFR BLD AUTO: 45.5 % (ref 37.5–51)
HGB BLD-MCNC: 15.8 G/DL (ref 13–17.7)
MCH RBC QN AUTO: 31.3 PG (ref 26.6–33)
MCHC RBC AUTO-ENTMCNC: 34.7 G/DL (ref 31.5–35.7)
MCV RBC AUTO: 90.3 FL (ref 79–97)
PLATELET # BLD AUTO: 290 10*3/MM3 (ref 140–450)
PMV BLD AUTO: 9.7 FL (ref 6–12)
POTASSIUM SERPL-SCNC: 3.9 MMOL/L (ref 3.5–5.2)
PROT SERPL-MCNC: 7.2 G/DL (ref 6–8.5)
RBC # BLD AUTO: 5.04 10*6/MM3 (ref 4.14–5.8)
SODIUM SERPL-SCNC: 140 MMOL/L (ref 136–145)
TESTOST SERPL-MCNC: 376 NG/DL (ref 249–836)
URATE SERPL-MCNC: 5.7 MG/DL (ref 3.4–7)
WBC NRBC COR # BLD AUTO: 7.43 10*3/MM3 (ref 3.4–10.8)

## 2025-04-26 LAB — CCP IGA+IGG SERPL IA-ACNC: 7 UNITS (ref 0–19)

## 2025-04-28 ENCOUNTER — PATIENT ROUNDING (BHMG ONLY) (OUTPATIENT)
Dept: FAMILY MEDICINE CLINIC | Facility: CLINIC | Age: 45
End: 2025-04-28
Payer: COMMERCIAL

## 2025-04-28 LAB — ANA SER QL: NEGATIVE

## 2025-07-21 ENCOUNTER — OFFICE VISIT (OUTPATIENT)
Dept: FAMILY MEDICINE CLINIC | Facility: CLINIC | Age: 45
End: 2025-07-21
Payer: COMMERCIAL

## 2025-07-21 VITALS
DIASTOLIC BLOOD PRESSURE: 70 MMHG | WEIGHT: 225.4 LBS | HEART RATE: 67 BPM | OXYGEN SATURATION: 98 % | BODY MASS INDEX: 33.38 KG/M2 | TEMPERATURE: 97.9 F | SYSTOLIC BLOOD PRESSURE: 116 MMHG | HEIGHT: 69 IN

## 2025-07-21 DIAGNOSIS — J06.9 ACUTE URI: ICD-10-CM

## 2025-07-21 DIAGNOSIS — Z00.00 ENCOUNTER FOR WELLNESS EXAMINATION: Primary | ICD-10-CM

## 2025-07-21 DIAGNOSIS — J34.89 NOSE DISCHARGE: ICD-10-CM

## 2025-07-21 DIAGNOSIS — Z00.00 HEALTH CARE MAINTENANCE: ICD-10-CM

## 2025-07-21 PROCEDURE — 99396 PREV VISIT EST AGE 40-64: CPT | Performed by: INTERNAL MEDICINE

## 2025-07-21 PROCEDURE — 80050 GENERAL HEALTH PANEL: CPT | Performed by: INTERNAL MEDICINE

## 2025-07-21 PROCEDURE — 99213 OFFICE O/P EST LOW 20 MIN: CPT | Performed by: INTERNAL MEDICINE

## 2025-07-21 PROCEDURE — 80061 LIPID PANEL: CPT | Performed by: INTERNAL MEDICINE

## 2025-07-21 RX ORDER — DOXYCYCLINE 100 MG/1
100 CAPSULE ORAL 2 TIMES DAILY
Qty: 20 CAPSULE | Refills: 0 | Status: SHIPPED | OUTPATIENT
Start: 2025-07-21

## 2025-07-21 RX ORDER — VENLAFAXINE HYDROCHLORIDE 75 MG/1
75 CAPSULE, EXTENDED RELEASE ORAL DAILY
COMMUNITY
Start: 2025-07-02

## 2025-07-21 NOTE — PROGRESS NOTES
Venipuncture Blood Specimen Collection  Venipuncture performed in right antecubital by Heather Symes, MA with good hemostasis. Patient tolerated the procedure well without complications.   07/21/25   Heather Symes, MA

## 2025-07-21 NOTE — PROGRESS NOTES
"  Patient Name: Paras Freed Today's Date: 2025   Patient MRN / CSN: [id number] / [id number] Date of Encounter: 2025   Patient Age / : 44 y.o. / 1980 Encounter Provider: Ellen Magana DO   Referring Physician: No ref. provider found          Paras is a 44 y.o. male who is being seen today for Annual Exam (Doing okay today. ), Earache (Having right ear pain, feels like he hears a crackling in it as well. ), and Mass (Has little bumps in his nose and if they pop open they smell bad. )      Paras presents for  exam today. He reports doing pretty well.  He has noted some little bumps that come up in his nostrils bilaterally, get very tender, and sometimes pop open.  He notes that they smell bad.  He has been having this intermittently over the past 3 to 6 months.  He is also having right ear pain and feels like there is a crackling sound within his ear.  He had a hearing test at work and was told that he had minor hearing loss of the right ear.  He denies fever but notes sinus drainage, despite taking Zyrtec regularly.     Healthy Diet: trying to  Exercise: walking regularly  Regular Eye Exam: utd  Regular Dental Exam: not utd, no recent dental issues  Hearing Loss: right ear hearing loss-\"minor\"  Immunizations: declines covid, utd on tdap  Psa: no family history, start at 50  Colonoscopy: no family history, start at 45    Allergies include:Patient has no known allergies.  Current Outpatient Medications   Medication Sig Dispense Refill    cetirizine (zyrTEC) 10 MG tablet Take 1 tablet by mouth Daily.      lamoTRIgine (LaMICtal) 100 MG tablet Take 1 tablet by mouth Daily. (Patient taking differently: Take 2 tablets by mouth Daily.)      venlafaxine XR (EFFEXOR-XR) 150 MG 24 hr capsule Take 1 capsule by mouth Every Morning.      venlafaxine XR (EFFEXOR-XR) 75 MG 24 hr capsule Take 1 capsule by mouth Daily. Take with the 150mg      Vitamin D, Cholecalciferol, (CHOLECALCIFEROL) 10 MCG (400 UNIT) " "tablet Take 1 tablet by mouth Daily.      doxycycline (VIBRAMYCIN) 100 MG capsule Take 1 capsule by mouth 2 (Two) Times a Day. 20 capsule 0    Probiotic Product (PROBIOTIC BLEND PO) Probiotic       No current facility-administered medications for this visit.     Past Medical History:   Diagnosis Date    Allergic     Arthritis of back Several years    Back pain     Blood in stool     Depression     Rotator cuff syndrome 8months    Tendinitis of knee 30 years     Family History   Problem Relation Age of Onset    Other Father     Hypertension Father     Heart disease Father     Alcohol abuse Father     Depression Father     Drug abuse Father     Cancer Maternal Grandmother     Diabetes Paternal Grandmother     Heart disease Paternal Grandfather     Diabetes Paternal Grandfather     Broken bones Sister         Arm     Past Surgical History:   Procedure Laterality Date    HERNIA REPAIR      TONSILLECTOMY      VASECTOMY       Social History     Substance and Sexual Activity   Alcohol Use Not Currently    Comment: A lot     Social History     Tobacco Use   Smoking Status Former    Current packs/day: 0.00    Average packs/day: 2.0 packs/day for 11.0 years (22.1 ttl pk-yrs)    Types: Cigarettes, Cigars    Start date: 2000    Quit date: 2012    Years since quittin.5   Smokeless Tobacco Never   Tobacco Comments    2 packs a day     Social History     Substance and Sexual Activity   Drug Use Not Currently    Types: \"Crack\" cocaine, Cocaine(coke), Codeine, Hydrocodone, Marijuana, MDMA (ecstacy), Methamphetamines, Morphine, Oxycodone     Review of Systems   Constitutional:  Negative for fever.   HENT:  Positive for congestion, ear pain and postnasal drip. Negative for sore throat.         Nostril sores bilaterally, intermittent for past 6 months with bad odor   Respiratory:  Positive for cough. Negative for wheezing.         Depression Assessment Review:  PHQ-9 Total Score:    Vital Signs & Measurements Taken This " "Encounter  /70 (BP Location: Left arm, Patient Position: Sitting, Cuff Size: Large Adult)   Pulse 67   Temp 97.9 °F (36.6 °C) (Oral)   Ht 175.3 cm (69\")   Wt 102 kg (225 lb 6.4 oz)   SpO2 98%   BMI 33.29 kg/m²    SpO2 Percentage    07/21/25 1628   SpO2: 98%               Physical Exam  Vitals reviewed.   Constitutional:       General: He is not in acute distress.  HENT:      Head: Normocephalic and atraumatic.      Left Ear: Tympanic membrane normal.      Ears:      Comments: Right TM is erythematous with mucoid effusion     Nose:      Comments: Erythema of the nostrils bilaterally but I do not see any nasal abscess on today's evaluation     Mouth/Throat:      Mouth: Mucous membranes are moist.      Pharynx: Posterior oropharyngeal erythema present. No oropharyngeal exudate.      Comments: Yellow PND  Eyes:      General: No scleral icterus.     Extraocular Movements: Extraocular movements intact.      Conjunctiva/sclera: Conjunctivae normal.      Pupils: Pupils are equal, round, and reactive to light.   Cardiovascular:      Rate and Rhythm: Normal rate and regular rhythm.   Pulmonary:      Effort: Pulmonary effort is normal. No respiratory distress.      Breath sounds: Normal breath sounds.   Musculoskeletal:         General: No swelling.      Cervical back: Neck supple. Tenderness present.   Lymphadenopathy:      Cervical: Cervical adenopathy present.   Skin:     General: Skin is warm and dry.      Coloration: Skin is not jaundiced.   Neurological:      Mental Status: He is alert.   Psychiatric:         Mood and Affect: Mood normal.         Behavior: Behavior normal.         Thought Content: Thought content normal.         Judgment: Judgment normal.            Assessment & Plan  Patient Active Problem List   Diagnosis    Mixed anxiety and depressive disorder    Finger injury, left, initial encounter    Arthralgia of right hand    Chronic fatigue    Major depressive disorder, recurrent, moderate       " ICD-10-CM ICD-9-CM   1. Encounter for wellness examination  Z00.00 V70.0   2. Health care maintenance  Z00.00 V70.0   3. Nose discharge  J34.89 478.19   4. Acute URI  J06.9 465.9     Diagnoses and all orders for this visit:    1. Encounter for wellness examination (Primary)  -     CBC (No Diff)  -     Comprehensive Metabolic Panel  -     Lipid Panel  -     TSH    2. Health care maintenance  -     CBC (No Diff)  -     Comprehensive Metabolic Panel  -     Lipid Panel  -     TSH    3. Nose discharge  -     MRSA Screen Culture (Outpatient) - Swab, Nares    4. Acute URI  -     doxycycline (VIBRAMYCIN) 100 MG capsule; Take 1 capsule by mouth 2 (Two) Times a Day.  Dispense: 20 capsule; Refill: 0         Meds Ordered During Visit:  New Medications Ordered This Visit   Medications    doxycycline (VIBRAMYCIN) 100 MG capsule     Sig: Take 1 capsule by mouth 2 (Two) Times a Day.     Dispense:  20 capsule     Refill:  0       Immunizations were discussed with patient today.  He is up-to-date on tetanus shot.  He declines COVID-vaccine.    Age-appropriate screenings were discussed with patient today.  Metabolic screenings were updated today as above.    I encouraged healthy habits including healthy diet and exercise.  I discussed doxycycline with patient today for acute URI symptoms.  I also recommended MRSA nasal swab, which was done today.  We will await lab findings for further recommendations.    Return in about 1 year (around 7/21/2026), or if symptoms worsen or fail to improve, for Annual, Recheck.          Referring Provider (if known): No ref. provider found      This document has been electronically signed by Ellen Magana DO  July 21, 2025 18:45 EDT    Ellen Magana DO, FACOI  [address] [place] [address]  [phone] (office)    Part of this note may be an electronic transcription/translation of spoken language to printed text using the Dragon Dictation System.

## 2025-07-22 LAB
ALBUMIN SERPL-MCNC: 4.5 G/DL (ref 3.5–5.2)
ALBUMIN/GLOB SERPL: 1.8 G/DL
ALP SERPL-CCNC: 77 U/L (ref 39–117)
ALT SERPL W P-5'-P-CCNC: 17 U/L (ref 1–41)
ANION GAP SERPL CALCULATED.3IONS-SCNC: 10.8 MMOL/L (ref 5–15)
AST SERPL-CCNC: 21 U/L (ref 1–40)
BILIRUB SERPL-MCNC: 0.2 MG/DL (ref 0–1.2)
BUN SERPL-MCNC: 10 MG/DL (ref 6–20)
BUN/CREAT SERPL: 8.6 (ref 7–25)
CALCIUM SPEC-SCNC: 9.7 MG/DL (ref 8.6–10.5)
CHLORIDE SERPL-SCNC: 106 MMOL/L (ref 98–107)
CHOLEST SERPL-MCNC: 197 MG/DL (ref 0–200)
CO2 SERPL-SCNC: 24.2 MMOL/L (ref 22–29)
CREAT SERPL-MCNC: 1.16 MG/DL (ref 0.76–1.27)
DEPRECATED RDW RBC AUTO: 42.5 FL (ref 37–54)
EGFRCR SERPLBLD CKD-EPI 2021: 80.1 ML/MIN/1.73
ERYTHROCYTE [DISTWIDTH] IN BLOOD BY AUTOMATED COUNT: 12.5 % (ref 12.3–15.4)
GLOBULIN UR ELPH-MCNC: 2.5 GM/DL
GLUCOSE SERPL-MCNC: 74 MG/DL (ref 65–99)
HCT VFR BLD AUTO: 44.8 % (ref 37.5–51)
HDLC SERPL-MCNC: 47 MG/DL (ref 40–60)
HGB BLD-MCNC: 15.3 G/DL (ref 13–17.7)
LDLC SERPL CALC-MCNC: 117 MG/DL (ref 0–100)
LDLC/HDLC SERPL: 2.38 {RATIO}
MCH RBC QN AUTO: 31.5 PG (ref 26.6–33)
MCHC RBC AUTO-ENTMCNC: 34.2 G/DL (ref 31.5–35.7)
MCV RBC AUTO: 92.4 FL (ref 79–97)
MRSA DNA SPEC QL NAA+PROBE: NORMAL
PLATELET # BLD AUTO: 277 10*3/MM3 (ref 140–450)
PMV BLD AUTO: 9.3 FL (ref 6–12)
POTASSIUM SERPL-SCNC: 4 MMOL/L (ref 3.5–5.2)
PROT SERPL-MCNC: 7 G/DL (ref 6–8.5)
RBC # BLD AUTO: 4.85 10*6/MM3 (ref 4.14–5.8)
SODIUM SERPL-SCNC: 141 MMOL/L (ref 136–145)
TRIGL SERPL-MCNC: 191 MG/DL (ref 0–150)
TSH SERPL DL<=0.05 MIU/L-ACNC: 1.88 UIU/ML (ref 0.27–4.2)
VLDLC SERPL-MCNC: 33 MG/DL (ref 5–40)
WBC NRBC COR # BLD AUTO: 7.76 10*3/MM3 (ref 3.4–10.8)

## 2025-07-22 PROCEDURE — 87641 MR-STAPH DNA AMP PROBE: CPT | Performed by: INTERNAL MEDICINE
